# Patient Record
Sex: FEMALE | Race: BLACK OR AFRICAN AMERICAN | NOT HISPANIC OR LATINO | Employment: STUDENT | URBAN - METROPOLITAN AREA
[De-identification: names, ages, dates, MRNs, and addresses within clinical notes are randomized per-mention and may not be internally consistent; named-entity substitution may affect disease eponyms.]

---

## 2017-07-03 ENCOUNTER — HOSPITAL ENCOUNTER (EMERGENCY)
Facility: OTHER | Age: 32
Discharge: HOME | End: 2017-07-03
Attending: EMERGENCY MEDICINE
Payer: MEDICAID

## 2017-07-03 VITALS
DIASTOLIC BLOOD PRESSURE: 86 MMHG | SYSTOLIC BLOOD PRESSURE: 135 MMHG | TEMPERATURE: 99 F | HEIGHT: 65 IN | RESPIRATION RATE: 18 BRPM | OXYGEN SATURATION: 99 % | HEART RATE: 89 BPM | BODY MASS INDEX: 48.82 KG/M2 | WEIGHT: 293 LBS

## 2017-07-03 DIAGNOSIS — G93.2 PSEUDOTUMOR CEREBRI: Primary | ICD-10-CM

## 2017-07-03 DIAGNOSIS — H57.13 EYE PAIN, BILATERAL: ICD-10-CM

## 2017-07-03 LAB
B-HCG UR QL: NEGATIVE
CTP QC/QA: YES

## 2017-07-03 PROCEDURE — 25000003 PHARM REV CODE 250: Performed by: PHYSICIAN ASSISTANT

## 2017-07-03 PROCEDURE — 99283 EMERGENCY DEPT VISIT LOW MDM: CPT | Mod: 25

## 2017-07-03 PROCEDURE — 81025 URINE PREGNANCY TEST: CPT | Performed by: EMERGENCY MEDICINE

## 2017-07-03 RX ORDER — ACETAMINOPHEN AND CODEINE PHOSPHATE 300; 30 MG/1; MG/1
1 TABLET ORAL
Status: COMPLETED | OUTPATIENT
Start: 2017-07-03 | End: 2017-07-03

## 2017-07-03 RX ORDER — NAPROXEN 500 MG/1
500 TABLET ORAL 2 TIMES DAILY WITH MEALS
Qty: 14 TABLET | Refills: 0 | Status: SHIPPED | OUTPATIENT
Start: 2017-07-03 | End: 2017-07-10

## 2017-07-03 RX ORDER — IBUPROFEN 400 MG/1
800 TABLET ORAL
Status: COMPLETED | OUTPATIENT
Start: 2017-07-03 | End: 2017-07-03

## 2017-07-03 RX ORDER — ACETAMINOPHEN AND CODEINE PHOSPHATE 300; 30 MG/1; MG/1
1 TABLET ORAL EVERY 6 HOURS PRN
Qty: 8 TABLET | Refills: 0 | Status: SHIPPED | OUTPATIENT
Start: 2017-07-03 | End: 2017-07-13

## 2017-07-03 RX ORDER — PROPARACAINE HYDROCHLORIDE 5 MG/ML
1 SOLUTION/ DROPS OPHTHALMIC
Status: COMPLETED | OUTPATIENT
Start: 2017-07-03 | End: 2017-07-03

## 2017-07-03 RX ADMIN — ACETAMINOPHEN AND CODEINE PHOSPHATE 1 TABLET: 300; 30 TABLET ORAL at 11:07

## 2017-07-03 RX ADMIN — FLUORESCEIN SODIUM 1 STRIP: 1 STRIP OPHTHALMIC at 09:07

## 2017-07-03 RX ADMIN — IBUPROFEN 800 MG: 400 TABLET, FILM COATED ORAL at 11:07

## 2017-07-03 RX ADMIN — PROPARACAINE HYDROCHLORIDE 1 DROP: 5 SOLUTION/ DROPS OPHTHALMIC at 09:07

## 2017-07-04 NOTE — ED PROVIDER NOTES
Encounter Date: 7/3/2017       History     Chief Complaint   Patient presents with    Eye Pain     pt c/o 9/10 pain to bilateral eyes since november, pt denies going to eye doctor     32 y/o female with history of HTN, Asthma, obesity presents to the ER with chief complaint of bilateral eye pain intermittent for 5 years.  Patient was told that she has Pseudotumor cerebri in 2012.  The patient was referred to a neurologist by her ophthalmologist.     She was treated with naproxen, Tylenol #3, and percocet for her pain in the past.  The patient was scheduled to have a spinal tap in the past, but says this was never rescheduled.  The patient took tylenol PM for her pain 2 days ago. She reports joint pain also ongoing for 5 years.  Her pain has been worsening since November of 2016, but lost her PCP 1 year ago so she has not seen anyone recently.    She reports intermittent blurry vision.  She has intermittent left frontal headache, no current pain.  She currently has 9/10 behind both eyes.  She reports watery drainage from the eye, which makes her pain worse.  She denies nausea, vomiting, fever, or chills.            Review of patient's allergies indicates:   Allergen Reactions    Peanut     Shellfish containing products     Iodine and iodide containing products Itching     Past Medical History:   Diagnosis Date    Asthma     Hypertension     Obesity      Past Surgical History:   Procedure Laterality Date    APPENDECTOMY  7/2013    BOWEL RESECTION  7/2013    BREAST SURGERY      right breast I&D x 3    TONSILLECTOMY       Family History   Problem Relation Age of Onset    Breast cancer Other     Ovarian cancer Neg Hx      Social History   Substance Use Topics    Smoking status: Current Every Day Smoker     Packs/day: 0.50     Years: 5.00     Types: Cigarettes    Smokeless tobacco: Current User    Alcohol use No     Review of Systems   Constitutional: Negative for chills and fever.   HENT: Negative for  sore throat.    Eyes: Positive for pain. Negative for visual disturbance.   Respiratory: Negative for shortness of breath.    Cardiovascular: Negative for chest pain.   Gastrointestinal: Negative for abdominal pain, nausea and vomiting.   Genitourinary: Negative for dysuria.   Musculoskeletal: Negative for back pain.   Skin: Negative for rash.   Neurological: Positive for headaches. Negative for dizziness, syncope and weakness.   Hematological: Does not bruise/bleed easily.       Physical Exam     Initial Vitals [07/03/17 2024]   BP Pulse Resp Temp SpO2   138/81 106 18 98.6 °F (37 °C) 99 %      MAP       100         Physical Exam    Constitutional: She appears well-developed and well-nourished. No distress.   HENT:   Head: Atraumatic.   Mouth/Throat: Oropharynx is clear and moist.   Eyes: Conjunctivae and EOM are normal. Pupils are equal, round, and reactive to light. Right eye exhibits discharge (watery drainage). Left eye exhibits discharge ( watery drainage).   tonopen exam: right eye pressure = 20 , left eye pressure = 17.  Normal fundoscopic exam.   Neck: Normal range of motion. Neck supple.   Cardiovascular: Normal rate and regular rhythm.   Pulmonary/Chest: Breath sounds normal. No respiratory distress. She has no wheezes. She has no rhonchi. She has no rales.   Abdominal: Soft. Bowel sounds are normal. There is no tenderness.   Neurological: She is alert and oriented to person, place, and time. She has normal strength. No cranial nerve deficit.   Skin: Capillary refill takes less than 2 seconds. No rash noted.   Psychiatric: She has a normal mood and affect.         ED Course   Procedures  Labs Reviewed   POCT URINE PREGNANCY - Normal                   APC / Resident Notes:   Patient presents to the ER chief complaint of eye pain bilaterally.  Patient states that she experiences headaches behind her eyes daily ( intermittently in the left temple) for the last 5 years since being diagnosed with pseudotumor  cerebri.  The patient was referred to a neurologist, but did not have appropriate follow-up due to rescheduled spinal tap.  She recently lost her primary care physician and is not being followed in clinic at all.  Patient states that in the past when her pain became severe she was prescribed pain medications until her pain was tolerable.    She has not been evaluated for her joint pain by a rheumatologist in the past.  She has no joint redness, recent fever or obvious joint swelling.  I do not suspect infectious process.  There is no recent injury.    On exam, patient has normal intraocular pressure and no obvious abnormal funduscopic exam findings. Vision is normal. She has watery drainage from the eyes, otherwise no significant eye redness.    She has no focal weakness, numbness, meningismus, other focal neurologic deficit, history of trauma, fevers, elevated blood pressure to suggest meningitis, subarachnoid hemorrhage, TIA, stroke, mass, or hypertensive urgency.   The patient will require follow-up with internal medicine and neurology, but I do not see an indication for any emergent imaging or further testing in the ER for patient's chronic condition.  I will treat her pain with Tylenol #3 and naproxen.  I'll give first dose of these medications in the ER.  The patient is comfortable with this plan.  She is given return precautions.I discussed the care of this patient with my supervising MD.                  ED Course     Clinical Impression:   The primary encounter diagnosis was Pseudotumor cerebri. A diagnosis of Eye pain, bilateral was also pertinent to this visit.                           MACARIO Chandler  07/04/17 0135

## 2017-07-04 NOTE — ED NOTES
Pt last eye exam was 2 years ago when she got her present glasses that she wears everyday. She states that she has had eye pain and headache in her eyes since November.

## 2017-07-25 ENCOUNTER — HOSPITAL ENCOUNTER (EMERGENCY)
Facility: HOSPITAL | Age: 32
Discharge: HOME | End: 2017-07-25
Attending: EMERGENCY MEDICINE
Payer: MEDICAID

## 2017-07-25 VITALS
DIASTOLIC BLOOD PRESSURE: 71 MMHG | HEART RATE: 90 BPM | BODY MASS INDEX: 55.08 KG/M2 | TEMPERATURE: 98 F | OXYGEN SATURATION: 99 % | RESPIRATION RATE: 18 BRPM | WEIGHT: 293 LBS | SYSTOLIC BLOOD PRESSURE: 130 MMHG

## 2017-07-25 DIAGNOSIS — R52 BODY ACHES: Primary | ICD-10-CM

## 2017-07-25 PROCEDURE — 25000003 PHARM REV CODE 250: Performed by: NURSE PRACTITIONER

## 2017-07-25 PROCEDURE — 99283 EMERGENCY DEPT VISIT LOW MDM: CPT | Mod: 25

## 2017-07-25 PROCEDURE — 96372 THER/PROPH/DIAG INJ SC/IM: CPT

## 2017-07-25 PROCEDURE — 63600175 PHARM REV CODE 636 W HCPCS: Performed by: NURSE PRACTITIONER

## 2017-07-25 RX ORDER — DICLOFENAC SODIUM 25 MG/1
75 TABLET, DELAYED RELEASE ORAL ONCE
Status: COMPLETED | OUTPATIENT
Start: 2017-07-25 | End: 2017-07-25

## 2017-07-25 RX ORDER — DICLOFENAC SODIUM 75 MG/1
75 TABLET, DELAYED RELEASE ORAL 2 TIMES DAILY
Qty: 60 TABLET | Refills: 0 | Status: SHIPPED | OUTPATIENT
Start: 2017-07-25

## 2017-07-25 RX ORDER — KETOROLAC TROMETHAMINE 30 MG/ML
15 INJECTION, SOLUTION INTRAMUSCULAR; INTRAVENOUS
Status: COMPLETED | OUTPATIENT
Start: 2017-07-25 | End: 2017-07-25

## 2017-07-25 RX ADMIN — DICLOFENAC SODIUM 75 MG: 25 TABLET, DELAYED RELEASE ORAL at 06:07

## 2017-07-25 RX ADMIN — KETOROLAC TROMETHAMINE 15 MG: 30 INJECTION, SOLUTION INTRAMUSCULAR at 05:07
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
## 2017-07-25 NOTE — ED PROVIDER NOTES
Encounter Date: 7/25/2017       History     Chief Complaint   Patient presents with    Generalized Body Aches     since July 5th.     Patient is a 31 y.o. female who presents to the ED 07/25/2017 with a chief complaint of global joint pain worse in the morning and at night x 1 month.  The pain is worse in the morning and at night and is better with NSAIDS.  The pain is in her bi shoulders, elbows, PIP joints, knees, and ankles;  aching like; 8/10.  Denies fever, recent illness, or headache.   PMH includes asthma, htn, obesity, and pseudotumor cerbri.               Review of patient's allergies indicates:   Allergen Reactions    Peanut     Shellfish containing products     Iodine and iodide containing products Itching     Past Medical History:   Diagnosis Date    Asthma     Hypertension     Obesity      Past Surgical History:   Procedure Laterality Date    APPENDECTOMY  7/2013    BOWEL RESECTION  7/2013    BREAST SURGERY      right breast I&D x 3    TONSILLECTOMY       Family History   Problem Relation Age of Onset    Breast cancer Other     Ovarian cancer Neg Hx      Social History   Substance Use Topics    Smoking status: Current Every Day Smoker     Packs/day: 0.50     Years: 5.00     Types: Cigarettes    Smokeless tobacco: Current User    Alcohol use No     Review of Systems   Constitutional: Negative for chills and fever.   HENT: Negative for sore throat.    Respiratory: Negative for chest tightness, shortness of breath and wheezing.    Cardiovascular: Negative for chest pain and leg swelling.   Gastrointestinal: Negative for abdominal pain.   Genitourinary: Negative for dysuria.   Musculoskeletal: Positive for arthralgias (bi shoulder, elbow, PIP, knees and ankles throbbing pain with intermittent swelling 8/10). Negative for myalgias.   Skin: Negative for rash and wound.   Neurological: Negative for syncope, weakness and numbness.   Hematological: Does not bruise/bleed easily.       Physical  Exam     Initial Vitals [07/25/17 1556]   BP Pulse Resp Temp SpO2   135/75 107 18 98.5 °F (36.9 °C) --      MAP       95         Physical Exam    Nursing note and vitals reviewed.  Constitutional: Vital signs are normal. She appears well-developed and well-nourished.   HENT:   Head: Normocephalic and atraumatic.   Eyes: Pupils are equal, round, and reactive to light.   Neck: Normal range of motion. Neck supple.   Cardiovascular: Normal rate, regular rhythm, normal heart sounds and intact distal pulses.   No murmur heard.  Pulmonary/Chest: Breath sounds normal.   Abdominal: Soft. Normal appearance and bowel sounds are normal.   Musculoskeletal:        Hands:  Neurological: She is alert and oriented to person, place, and time. She has normal strength.   Skin: Skin is warm, dry and intact.   Psychiatric: She has a normal mood and affect. Her speech is normal and behavior is normal.         ED Course   Procedures  Labs Reviewed - No data to display          Medical Decision Making:   Differential Diagnosis:   RA  OA  Viral illness       APC / Resident Notes:   Patient is a 31 y.o. female who presents to the ED 07/25/2017 with a chief complaint of global joint pain x 1 month. The pain was better with anti inflammatories. Patient encouraged to follow up with PCP for further studies.  Highly suspicious for RA.  Given prescription for Voltaren PO.  Discussed GI prophylaxis and pain management. Patient verbalized understanding and is agreeable to this plan of care and follow up. Verbalized understanding of return precautions.                     ED Course     Clinical Impression:   The encounter diagnosis was Body aches.                           Maria Alejandra Tapia NP  07/25/17 5991

## 2018-06-29 ENCOUNTER — PATIENT MESSAGE (OUTPATIENT)
Dept: OPTOMETRY | Facility: CLINIC | Age: 33
End: 2018-06-29

## 2020-05-18 DIAGNOSIS — Z90.49 HISTORY OF BOWEL RESECTION: Primary | ICD-10-CM

## 2021-01-30 ENCOUNTER — HOSPITAL ENCOUNTER (EMERGENCY)
Facility: HOSPITAL | Age: 36
Discharge: HOME | End: 2021-01-30
Attending: EMERGENCY MEDICINE
Payer: MEDICAID

## 2021-01-30 VITALS
TEMPERATURE: 99 F | OXYGEN SATURATION: 100 % | HEART RATE: 89 BPM | BODY MASS INDEX: 48.82 KG/M2 | HEIGHT: 65 IN | SYSTOLIC BLOOD PRESSURE: 150 MMHG | RESPIRATION RATE: 20 BRPM | WEIGHT: 293 LBS | DIASTOLIC BLOOD PRESSURE: 83 MMHG

## 2021-01-30 DIAGNOSIS — L50.9 URTICARIA: Primary | ICD-10-CM

## 2021-01-30 PROCEDURE — 99284 EMERGENCY DEPT VISIT MOD MDM: CPT | Mod: ,,, | Performed by: EMERGENCY MEDICINE

## 2021-01-30 PROCEDURE — 99283 EMERGENCY DEPT VISIT LOW MDM: CPT

## 2021-01-30 PROCEDURE — 99284 PR EMERGENCY DEPT VISIT,LEVEL IV: ICD-10-PCS | Mod: ,,, | Performed by: EMERGENCY MEDICINE

## 2021-01-30 PROCEDURE — 25000003 PHARM REV CODE 250: Performed by: STUDENT IN AN ORGANIZED HEALTH CARE EDUCATION/TRAINING PROGRAM

## 2021-01-30 RX ORDER — EPINEPHRINE 0.3 MG/.3ML
1 INJECTION SUBCUTANEOUS
Qty: 1 DEVICE | Refills: 1 | Status: SHIPPED | OUTPATIENT
Start: 2021-01-30 | End: 2022-01-30

## 2021-01-30 RX ORDER — DIPHENHYDRAMINE HCL 50 MG
50 CAPSULE ORAL
Status: COMPLETED | OUTPATIENT
Start: 2021-01-30 | End: 2021-01-30

## 2021-01-30 RX ADMIN — DIPHENHYDRAMINE HYDROCHLORIDE 50 MG: 50 CAPSULE ORAL at 09:01

## 2021-02-03 ENCOUNTER — HOSPITAL ENCOUNTER (EMERGENCY)
Facility: HOSPITAL | Age: 36
Discharge: HOME | End: 2021-02-03
Attending: EMERGENCY MEDICINE
Payer: MEDICAID

## 2021-02-03 VITALS
HEIGHT: 65 IN | OXYGEN SATURATION: 99 % | DIASTOLIC BLOOD PRESSURE: 68 MMHG | WEIGHT: 293 LBS | TEMPERATURE: 98 F | BODY MASS INDEX: 48.82 KG/M2 | HEART RATE: 91 BPM | SYSTOLIC BLOOD PRESSURE: 125 MMHG | RESPIRATION RATE: 17 BRPM

## 2021-02-03 DIAGNOSIS — R07.9 CHEST PAIN: ICD-10-CM

## 2021-02-03 DIAGNOSIS — R19.7 DIARRHEA, UNSPECIFIED TYPE: ICD-10-CM

## 2021-02-03 DIAGNOSIS — R11.10 VOMITING, INTRACTABILITY OF VOMITING NOT SPECIFIED, PRESENCE OF NAUSEA NOT SPECIFIED, UNSPECIFIED VOMITING TYPE: ICD-10-CM

## 2021-02-03 DIAGNOSIS — M79.605 PAIN OF LEFT LOWER EXTREMITY: Primary | ICD-10-CM

## 2021-02-03 DIAGNOSIS — M54.32 SCIATICA OF LEFT SIDE: ICD-10-CM

## 2021-02-03 PROCEDURE — 93010 EKG 12-LEAD: ICD-10-PCS | Mod: ,,, | Performed by: INTERNAL MEDICINE

## 2021-02-03 PROCEDURE — 99284 EMERGENCY DEPT VISIT MOD MDM: CPT | Mod: ,,, | Performed by: EMERGENCY MEDICINE

## 2021-02-03 PROCEDURE — 99284 PR EMERGENCY DEPT VISIT,LEVEL IV: ICD-10-PCS | Mod: ,,, | Performed by: EMERGENCY MEDICINE

## 2021-02-03 PROCEDURE — 93010 ELECTROCARDIOGRAM REPORT: CPT | Mod: ,,, | Performed by: INTERNAL MEDICINE

## 2021-02-03 PROCEDURE — 99284 EMERGENCY DEPT VISIT MOD MDM: CPT | Mod: 25

## 2021-02-03 PROCEDURE — 93005 ELECTROCARDIOGRAM TRACING: CPT

## 2021-02-03 RX ORDER — ONDANSETRON 4 MG/1
8 TABLET, ORALLY DISINTEGRATING ORAL EVERY 8 HOURS PRN
Qty: 12 TABLET | Refills: 0 | Status: SHIPPED | OUTPATIENT
Start: 2021-02-03

## 2021-02-03 RX ORDER — MELOXICAM 7.5 MG/1
7.5 TABLET ORAL DAILY
Qty: 7 TABLET | Refills: 0 | Status: SHIPPED | OUTPATIENT
Start: 2021-02-03

## 2021-02-03 RX ORDER — METHOCARBAMOL 500 MG/1
1000 TABLET, FILM COATED ORAL 3 TIMES DAILY
Qty: 30 TABLET | Refills: 0 | Status: SHIPPED | OUTPATIENT
Start: 2021-02-03 | End: 2021-02-08

## 2021-04-26 ENCOUNTER — PATIENT MESSAGE (OUTPATIENT)
Dept: RESEARCH | Facility: HOSPITAL | Age: 36
End: 2021-04-26

## 2022-05-23 ENCOUNTER — HOSPITAL ENCOUNTER (EMERGENCY)
Facility: HOSPITAL | Age: 37
Discharge: HOME | End: 2022-05-23
Attending: EMERGENCY MEDICINE
Payer: MEDICAID

## 2022-05-23 VITALS
RESPIRATION RATE: 20 BRPM | DIASTOLIC BLOOD PRESSURE: 78 MMHG | TEMPERATURE: 100 F | SYSTOLIC BLOOD PRESSURE: 145 MMHG | OXYGEN SATURATION: 100 % | HEART RATE: 95 BPM | WEIGHT: 293 LBS | BODY MASS INDEX: 48.82 KG/M2 | HEIGHT: 65 IN

## 2022-05-23 DIAGNOSIS — I10 HYPERTENSION, UNSPECIFIED TYPE: ICD-10-CM

## 2022-05-23 DIAGNOSIS — N93.9 VAGINAL BLEEDING: Primary | ICD-10-CM

## 2022-05-23 DIAGNOSIS — M25.473 ANKLE SWELLING: ICD-10-CM

## 2022-05-23 PROBLEM — F32.A DEPRESSION AFFECTING PREGNANCY, ANTEPARTUM: Status: ACTIVE | Noted: 2022-02-11

## 2022-05-23 PROBLEM — B95.1 POSITIVE GBS TEST: Status: ACTIVE | Noted: 2020-11-23

## 2022-05-23 PROBLEM — O99.340 ANXIETY IN PREGNANCY, ANTEPARTUM: Status: ACTIVE | Noted: 2022-02-11

## 2022-05-23 PROBLEM — O99.340 DEPRESSION AFFECTING PREGNANCY, ANTEPARTUM: Status: ACTIVE | Noted: 2022-02-11

## 2022-05-23 PROBLEM — R03.0 ELEVATED BLOOD-PRESSURE READING WITHOUT DIAGNOSIS OF HYPERTENSION: Status: ACTIVE | Noted: 2022-02-11

## 2022-05-23 PROBLEM — G93.2 PSEUDOTUMOR CEREBRI: Status: ACTIVE | Noted: 2020-11-23

## 2022-05-23 PROBLEM — O09.292 PRIOR PREGNANCY COMPLICATED BY IUGR, ANTEPARTUM, SECOND TRIMESTER: Status: ACTIVE | Noted: 2022-02-11

## 2022-05-23 PROBLEM — F41.9 ANXIETY: Status: ACTIVE | Noted: 2017-08-08

## 2022-05-23 PROBLEM — F41.9 ANXIETY IN PREGNANCY, ANTEPARTUM: Status: ACTIVE | Noted: 2022-02-11

## 2022-05-23 LAB
ALBUMIN SERPL BCP-MCNC: 2.6 G/DL (ref 3.5–5.2)
ALP SERPL-CCNC: 51 U/L (ref 55–135)
ALT SERPL W/O P-5'-P-CCNC: 19 U/L (ref 10–44)
ANION GAP SERPL CALC-SCNC: 11 MMOL/L (ref 8–16)
AST SERPL-CCNC: 23 U/L (ref 10–40)
BASOPHILS # BLD AUTO: 0.04 K/UL (ref 0–0.2)
BASOPHILS NFR BLD: 0.5 % (ref 0–1.9)
BILIRUB SERPL-MCNC: 0.3 MG/DL (ref 0.1–1)
BILIRUB UR QL STRIP: NEGATIVE
BUN SERPL-MCNC: 7 MG/DL (ref 6–20)
CALCIUM SERPL-MCNC: 8.7 MG/DL (ref 8.7–10.5)
CHLORIDE SERPL-SCNC: 106 MMOL/L (ref 95–110)
CLARITY UR REFRACT.AUTO: ABNORMAL
CO2 SERPL-SCNC: 21 MMOL/L (ref 23–29)
COLOR UR AUTO: YELLOW
CREAT SERPL-MCNC: 0.7 MG/DL (ref 0.5–1.4)
DIFFERENTIAL METHOD: ABNORMAL
EOSINOPHIL # BLD AUTO: 0.2 K/UL (ref 0–0.5)
EOSINOPHIL NFR BLD: 2.5 % (ref 0–8)
ERYTHROCYTE [DISTWIDTH] IN BLOOD BY AUTOMATED COUNT: 17.2 % (ref 11.5–14.5)
EST. GFR  (AFRICAN AMERICAN): >60 ML/MIN/1.73 M^2
EST. GFR  (NON AFRICAN AMERICAN): >60 ML/MIN/1.73 M^2
GLUCOSE SERPL-MCNC: 82 MG/DL (ref 70–110)
GLUCOSE UR QL STRIP: NEGATIVE
HCT VFR BLD AUTO: 33.2 % (ref 37–48.5)
HGB BLD-MCNC: 10.7 G/DL (ref 12–16)
HGB UR QL STRIP: ABNORMAL
IMM GRANULOCYTES # BLD AUTO: 0.02 K/UL (ref 0–0.04)
IMM GRANULOCYTES NFR BLD AUTO: 0.2 % (ref 0–0.5)
KETONES UR QL STRIP: NEGATIVE
LEUKOCYTE ESTERASE UR QL STRIP: ABNORMAL
LYMPHOCYTES # BLD AUTO: 1.9 K/UL (ref 1–4.8)
LYMPHOCYTES NFR BLD: 23.1 % (ref 18–48)
MCH RBC QN AUTO: 25.8 PG (ref 27–31)
MCHC RBC AUTO-ENTMCNC: 32.2 G/DL (ref 32–36)
MCV RBC AUTO: 80 FL (ref 82–98)
MICROSCOPIC COMMENT: ABNORMAL
MONOCYTES # BLD AUTO: 0.6 K/UL (ref 0.3–1)
MONOCYTES NFR BLD: 6.8 % (ref 4–15)
NEUTROPHILS # BLD AUTO: 5.5 K/UL (ref 1.8–7.7)
NEUTROPHILS NFR BLD: 66.9 % (ref 38–73)
NITRITE UR QL STRIP: NEGATIVE
NRBC BLD-RTO: 0 /100 WBC
PH UR STRIP: 5 [PH] (ref 5–8)
PLATELET # BLD AUTO: 192 K/UL (ref 150–450)
PMV BLD AUTO: 12.5 FL (ref 9.2–12.9)
POTASSIUM SERPL-SCNC: 3.4 MMOL/L (ref 3.5–5.1)
PROT SERPL-MCNC: 7 G/DL (ref 6–8.4)
PROT UR QL STRIP: NEGATIVE
RBC # BLD AUTO: 4.14 M/UL (ref 4–5.4)
RBC #/AREA URNS AUTO: >100 /HPF (ref 0–4)
SODIUM SERPL-SCNC: 138 MMOL/L (ref 136–145)
SP GR UR STRIP: 1.01 (ref 1–1.03)
SQUAMOUS #/AREA URNS AUTO: 1 /HPF
URN SPEC COLLECT METH UR: ABNORMAL
WBC # BLD AUTO: 8.14 K/UL (ref 3.9–12.7)
WBC #/AREA URNS AUTO: 13 /HPF (ref 0–5)

## 2022-05-23 PROCEDURE — 99285 PR EMERGENCY DEPT VISIT,LEVEL V: ICD-10-PCS | Mod: ,,, | Performed by: PHYSICIAN ASSISTANT

## 2022-05-23 PROCEDURE — 99285 EMERGENCY DEPT VISIT HI MDM: CPT | Mod: 25

## 2022-05-23 PROCEDURE — 80053 COMPREHEN METABOLIC PANEL: CPT | Performed by: EMERGENCY MEDICINE

## 2022-05-23 PROCEDURE — 85025 COMPLETE CBC W/AUTO DIFF WBC: CPT | Performed by: EMERGENCY MEDICINE

## 2022-05-23 PROCEDURE — 25000003 PHARM REV CODE 250: Performed by: PHYSICIAN ASSISTANT

## 2022-05-23 PROCEDURE — 87086 URINE CULTURE/COLONY COUNT: CPT | Performed by: PHYSICIAN ASSISTANT

## 2022-05-23 PROCEDURE — 81001 URINALYSIS AUTO W/SCOPE: CPT | Performed by: PHYSICIAN ASSISTANT

## 2022-05-23 PROCEDURE — 99285 EMERGENCY DEPT VISIT HI MDM: CPT | Mod: ,,, | Performed by: PHYSICIAN ASSISTANT

## 2022-05-23 RX ORDER — LABETALOL 100 MG/1
200 TABLET, FILM COATED ORAL
Status: COMPLETED | OUTPATIENT
Start: 2022-05-23 | End: 2022-05-23

## 2022-05-23 RX ORDER — ASPIRIN 81 MG/1
81 TABLET ORAL DAILY
COMMUNITY

## 2022-05-23 RX ORDER — ALBUTEROL SULFATE 0.63 MG/3ML
0.63 SOLUTION RESPIRATORY (INHALATION) EVERY 6 HOURS PRN
COMMUNITY

## 2022-05-23 RX ADMIN — LABETALOL HYDROCHLORIDE 200 MG: 100 TABLET, FILM COATED ORAL at 05:05

## 2022-05-23 NOTE — ED NOTES
Assumed care of pt at this time. VSS, RR even and unlabored. Resting in bed comfortably. No voiced compaints of pain or discomfort at this time. Safety protocols remain, will continue to monitor.

## 2022-05-23 NOTE — ED NOTES
"Patient states bleeding that ran " down my leg" at 1330 today while standing in line, states she had a vaginal delivery 2 days ago, states abd pain, left foot swelling and SOB. Did not take Labetolol today, does not know dose.   "

## 2022-05-23 NOTE — ED NOTES
"Patient identifiers verified and correct for Ms Lombardo  C/C: Vaginal bleeding SEE NN  APPEARANCE: awake and alert in NAD.  SKIN: warm, dry and intact. No breakdown or bruising.  MUSCULOSKELETAL: Patient moving all extremities spontaneously, no obvious swelling or deformities noted. Ambulates independently.  RESPIRATORY: Positive  shortness of breath.Respirations unlabored.   CARDIAC: Denies CP, 2+ distal pulses; no peripheral edema  ABDOMEN: ABdomen round, pain to lower abdomen, reports bleeding " running down my leg"    : voids spontaneously, denies difficulty  Neurologic: AAO x 4; follows commands equal strength in all extremities; denies numbness/tingling. Denies dizziness  Denies weakness    "

## 2022-05-24 NOTE — DISCHARGE INSTRUCTIONS
Diagnosis:  Postpartum vaginal bleeding, hypertension    I am not sure what is causing your bleeding today but it seems to have slowed to normal postpartum lochia.  Your blood counts are stable.  Your blood pressure was elevated in the emergency department your not showing any signs or symptoms of preeclampsia.  Make sure to continue taking your labetalol as prescribed.    I discussed your case with our obstetric doctor at [facility] who will arrange for follow-up.  They will call to schedule a follow-up appointment.  If you start to have any worsening symptoms, very heavy bleeding lightheadedness, weakness, passing out, fever, severe abdominal pain severe headache, visual changes or swelling of your legs please return to the emergency department immediately.

## 2022-05-25 LAB — BACTERIA UR CULT: NORMAL

## 2022-05-31 ENCOUNTER — HOSPITAL ENCOUNTER (EMERGENCY)
Facility: OTHER | Age: 37
Discharge: HOME | End: 2022-05-31
Attending: OBSTETRICS & GYNECOLOGY
Payer: MEDICAID

## 2022-05-31 VITALS
OXYGEN SATURATION: 100 % | HEIGHT: 65 IN | SYSTOLIC BLOOD PRESSURE: 154 MMHG | HEART RATE: 87 BPM | WEIGHT: 293 LBS | BODY MASS INDEX: 48.82 KG/M2 | DIASTOLIC BLOOD PRESSURE: 77 MMHG | RESPIRATION RATE: 18 BRPM | TEMPERATURE: 98 F

## 2022-05-31 DIAGNOSIS — I10 HYPERTENSION, UNSPECIFIED TYPE: ICD-10-CM

## 2022-05-31 DIAGNOSIS — N63.10 BREAST MASS, RIGHT: ICD-10-CM

## 2022-05-31 DIAGNOSIS — H10.9 CONJUNCTIVITIS OF LEFT EYE, UNSPECIFIED CONJUNCTIVITIS TYPE: Primary | ICD-10-CM

## 2022-05-31 LAB
ALBUMIN SERPL BCP-MCNC: 3 G/DL (ref 3.5–5.2)
ALP SERPL-CCNC: 62 U/L (ref 55–135)
ALT SERPL W/O P-5'-P-CCNC: 13 U/L (ref 10–44)
ANION GAP SERPL CALC-SCNC: 13 MMOL/L (ref 8–16)
AST SERPL-CCNC: 21 U/L (ref 10–40)
BASOPHILS # BLD AUTO: 0.03 K/UL (ref 0–0.2)
BASOPHILS NFR BLD: 0.4 % (ref 0–1.9)
BILIRUB SERPL-MCNC: 0.2 MG/DL (ref 0.1–1)
BUN SERPL-MCNC: 10 MG/DL (ref 6–20)
CALCIUM SERPL-MCNC: 8.7 MG/DL (ref 8.7–10.5)
CHLORIDE SERPL-SCNC: 108 MMOL/L (ref 95–110)
CO2 SERPL-SCNC: 21 MMOL/L (ref 23–29)
CREAT SERPL-MCNC: 0.7 MG/DL (ref 0.5–1.4)
DIFFERENTIAL METHOD: ABNORMAL
EOSINOPHIL # BLD AUTO: 0.4 K/UL (ref 0–0.5)
EOSINOPHIL NFR BLD: 5.3 % (ref 0–8)
ERYTHROCYTE [DISTWIDTH] IN BLOOD BY AUTOMATED COUNT: 16.8 % (ref 11.5–14.5)
EST. GFR  (AFRICAN AMERICAN): >60 ML/MIN/1.73 M^2
EST. GFR  (NON AFRICAN AMERICAN): >60 ML/MIN/1.73 M^2
GLUCOSE SERPL-MCNC: 83 MG/DL (ref 70–110)
HCT VFR BLD AUTO: 35.4 % (ref 37–48.5)
HGB BLD-MCNC: 11.2 G/DL (ref 12–16)
IMM GRANULOCYTES # BLD AUTO: 0.01 K/UL (ref 0–0.04)
IMM GRANULOCYTES NFR BLD AUTO: 0.1 % (ref 0–0.5)
LYMPHOCYTES # BLD AUTO: 1.8 K/UL (ref 1–4.8)
LYMPHOCYTES NFR BLD: 24.3 % (ref 18–48)
MCH RBC QN AUTO: 26.5 PG (ref 27–31)
MCHC RBC AUTO-ENTMCNC: 31.6 G/DL (ref 32–36)
MCV RBC AUTO: 84 FL (ref 82–98)
MONOCYTES # BLD AUTO: 0.4 K/UL (ref 0.3–1)
MONOCYTES NFR BLD: 5.1 % (ref 4–15)
NEUTROPHILS # BLD AUTO: 4.8 K/UL (ref 1.8–7.7)
NEUTROPHILS NFR BLD: 64.8 % (ref 38–73)
NRBC BLD-RTO: 0 /100 WBC
PLATELET # BLD AUTO: 238 K/UL (ref 150–450)
PMV BLD AUTO: 12.3 FL (ref 9.2–12.9)
POTASSIUM SERPL-SCNC: 3.7 MMOL/L (ref 3.5–5.1)
PROT SERPL-MCNC: 7.7 G/DL (ref 6–8.4)
RBC # BLD AUTO: 4.23 M/UL (ref 4–5.4)
SODIUM SERPL-SCNC: 142 MMOL/L (ref 136–145)
WBC # BLD AUTO: 7.38 K/UL (ref 3.9–12.7)

## 2022-05-31 PROCEDURE — 99285 EMERGENCY DEPT VISIT HI MDM: CPT | Mod: 25

## 2022-05-31 PROCEDURE — 25000003 PHARM REV CODE 250

## 2022-05-31 PROCEDURE — 85025 COMPLETE CBC W/AUTO DIFF WBC: CPT

## 2022-05-31 PROCEDURE — 80053 COMPREHEN METABOLIC PANEL: CPT

## 2022-05-31 PROCEDURE — 99284 EMERGENCY DEPT VISIT MOD MDM: CPT | Mod: 24,,, | Performed by: OBSTETRICS & GYNECOLOGY

## 2022-05-31 PROCEDURE — 99284 PR EMERGENCY DEPT VISIT,LEVEL IV: ICD-10-PCS | Mod: 24,,, | Performed by: OBSTETRICS & GYNECOLOGY

## 2022-05-31 RX ORDER — ACETAMINOPHEN 500 MG
1000 TABLET ORAL ONCE
Status: COMPLETED | OUTPATIENT
Start: 2022-05-31 | End: 2022-05-31

## 2022-05-31 RX ORDER — NIFEDIPINE 10 MG/1
10 CAPSULE ORAL ONCE
Status: COMPLETED | OUTPATIENT
Start: 2022-05-31 | End: 2022-05-31

## 2022-05-31 RX ORDER — LABETALOL 200 MG/1
200 TABLET, FILM COATED ORAL ONCE
Status: COMPLETED | OUTPATIENT
Start: 2022-05-31 | End: 2022-05-31

## 2022-05-31 RX ORDER — ERYTHROMYCIN 5 MG/G
OINTMENT OPHTHALMIC
Qty: 1 EACH | Refills: 0 | Status: SHIPPED | OUTPATIENT
Start: 2022-05-31

## 2022-05-31 RX ORDER — LABETALOL 200 MG/1
300 TABLET, FILM COATED ORAL 2 TIMES DAILY
Qty: 90 TABLET | Refills: 11 | OUTPATIENT
Start: 2022-05-31 | End: 2023-06-01

## 2022-05-31 RX ORDER — DICLOXACILLIN SODIUM 250 MG/1
500 CAPSULE ORAL 4 TIMES DAILY
Qty: 14 CAPSULE | Refills: 0 | Status: SHIPPED | OUTPATIENT
Start: 2022-05-31

## 2022-05-31 RX ADMIN — NIFEDIPINE 10 MG: 10 CAPSULE ORAL at 10:05

## 2022-05-31 RX ADMIN — LABETALOL HYDROCHLORIDE 200 MG: 200 TABLET, FILM COATED ORAL at 10:05

## 2022-05-31 RX ADMIN — ACETAMINOPHEN 1000 MG: 500 TABLET, FILM COATED ORAL at 10:05

## 2022-05-31 NOTE — ED NOTES
[employer] pump, tubing, collections containers and labels brought to bedside.  Discussed proper pump setting of initiation phase.  Instructed on proper usage of pump and to adjust suction according to maximum comfort level.  Verified appropriate flange fit.

## 2022-05-31 NOTE — ED PROVIDER NOTES
Encounter Date: 2022       History     Chief Complaint   Patient presents with    Multiple Complaints      Pt reports she had a baby ten day ago at [facility]. Reports problems with bleeding after birth. Pt reports dizziness, blurry vision, and HTN that started this morning. Went to community center and ambulance was called to take her here. Reports abdominal pain. Reports 4 pads of blood in a day. Reports right thumb swelling and left foot swelling since the beginning of may, received at [facility] of her foot. Reports discharge from right breast. Reports pain and discharge from left eye.     Siddhartha Lombardo is a 36 y.o.  PPD #10 s/p  presenting for concerns of breast discharge and left eye pain. She reports this morning she was at a community center and started to get left eye pain and tearing. She also reports a headache starting this morning and elevated BP. She has had what she reports as pus expressed from her right breast since delivery as well. Denies F/C, CP, SOB, RUQ pain. Lochia is normal, changing 4 pads per day. This IUP was complicated by HTN (patient unsure if cHTN vs. PreE, unsure if she received magnesium, taking labetalol 200 BID).         Review of patient's allergies indicates:   Allergen Reactions    Hydrocodone-acetaminophen Swelling     Pt can tolerate Percocet, but cannot take Norco  NO ISSUES WITH TAKING PERCOCET    Morphine Shortness Of Breath    Peanut     Shellfish containing products     Sulfamethoxazole-trimethoprim Hives and Itching    Iodine and iodide containing products Itching     Past Medical History:   Diagnosis Date    Asthma     Hypertension     Obesity      Past Surgical History:   Procedure Laterality Date    APPENDECTOMY  2013    BOWEL RESECTION  2013    BREAST SURGERY      right breast I&D x 3    TONSILLECTOMY       Family History   Problem Relation Age of Onset    Breast cancer Other     Ovarian cancer Neg Hx      Social History     Tobacco Use     Smoking status: Current Every Day Smoker     Packs/day: 0.50     Years: 5.00     Pack years: 2.50     Types: Cigarettes    Smokeless tobacco: Current User   Substance Use Topics    Alcohol use: No     Alcohol/week: 0.0 standard drinks    Drug use: Yes     Types: Marijuana     Review of Systems   Constitutional: Negative for chills and fever.   HENT: Negative for congestion and sore throat.    Eyes: Positive for pain, discharge and visual disturbance.   Respiratory: Negative for cough and shortness of breath.    Cardiovascular: Positive for chest pain (breast pain/discharge).   Gastrointestinal: Negative for abdominal pain, constipation, diarrhea, nausea and vomiting.   Genitourinary: Positive for vaginal bleeding. Negative for dysuria and vaginal discharge.   Musculoskeletal: Negative for back pain.   Skin: Negative for rash.   Neurological: Positive for headaches. Negative for weakness.   Hematological: Does not bruise/bleed easily.   Psychiatric/Behavioral: Positive for dysphoric mood. Negative for suicidal ideas. The patient is nervous/anxious.        Physical Exam     Initial Vitals [05/31/22 1002]   BP Pulse Resp Temp SpO2   (!) 165/100 84 18 98.5 °F (36.9 °C) 100 %      MAP       --         Physical Exam    Vitals reviewed.  Constitutional: She appears well-developed and well-nourished. She is not diaphoretic. No distress.   HENT:   Head: Normocephalic and atraumatic.   Eyes: EOM are normal. Right eye exhibits no discharge. Left eye exhibits discharge (left eye redness and tearing).   Neck:   Normal range of motion.  Cardiovascular: Normal rate.   Pulmonary/Chest: No respiratory distress. Right breast exhibits mass and nipple discharge.   Abdominal: There is no abdominal tenderness. There is no rebound and no guarding.   Musculoskeletal:         General: No edema.      Cervical back: Normal range of motion.     Neurological: She is alert and oriented to person, place, and time.   Skin: Skin is warm and  dry.   Psychiatric: She has a normal mood and affect. Thought content normal.         ED Course   Procedures  Labs Reviewed   CBC W/ AUTO DIFFERENTIAL - Abnormal; Notable for the following components:       Result Value    Hemoglobin 11.2 (*)     Hematocrit 35.4 (*)     MCH 26.5 (*)     MCHC 31.6 (*)     RDW 16.8 (*)     All other components within normal limits   COMPREHENSIVE METABOLIC PANEL - Abnormal; Notable for the following components:    CO2 21 (*)     Albumin 3.0 (*)     All other components within normal limits          Imaging Results          US Chest Mediastinum (Final result)  Result time 05/31/22 15:01:55    Final result by Jim Smith MD (05/31/22 15:01:55)                 Impression:      Abnormal examination with complex cyst measuring 2.3 x 0.7 x 1.5 cm containing a fluid fluid level.  There are edematous changes in the adjacent breast tissue and there appears to be overlying skin thickening.  Findings are worrisome for breast abscess with galactocele considered less likely.      Electronically signed by: Jim Smith MD  Date:    05/31/2022  Time:    15:01             Narrative:    EXAMINATION:  US CHEST MEDIASTINUM    CLINICAL HISTORY:  Possible right breast abscess;    TECHNIQUE:  Focused ultrasound examination of the right breast was performed in this patient with history of right breast discharge, described as patient as pus.  Note that the patient is 10 days status post spontaneous vaginal delivery.    COMPARISON:  None    FINDINGS:  Within the retroareolar tissues, there is an irregularly-shaped collection measuring 2.3 x 0.7 x 1.5 cm in size containing low-level internal echoes.  There does appear to be a fluid fluid level within this complex cyst.  Findings could certainly represent a galactocele in this patient who is 10 days postpartum, however there does appear to be surrounding hypoechoic breast tissue and there does appear to be some associated skin thickening when compared to  comparison views of the retroareolar left breast.  Findings are worrisome for small breast abscess.                                 Medications   NIFEdipine capsule 10 mg (10 mg Oral Given 5/31/22 1042)   acetaminophen tablet 1,000 mg (1,000 mg Oral Given 5/31/22 1045)   labetaloL tablet 200 mg (200 mg Oral Given 5/31/22 1045)     Medical Decision Making:   ED Management:  - Afebrile   - 2 sustained severe range BP > Procardia 10 and home labetalol 200 > remaining BP normal to mild range   - Tylenol 1g with resolution of headache   - CBC 7/11/35/238  - Cr 0.7, AST/ALT 21/13  - Left eye red with tearing > will send Rx for erythromycin ointment to pharmacy   - Right breast: small firm mass under right areola > abscess vs clogged duct   - Breast pump used > no change in size of mall palpated in right breast   - Right breast/chest US: possible abscess   - Will send referral to gen surg due to concern for possible abscess and start dicloxacillin   - Increase labetalol to 300mg BID   - Patient stable for discharge at this time  - ED return precautions given  - She voiced understanding and is in agreement with the plan            Attending Attestation:   Physician Attestation Statement for Resident:  As the supervising MD   Physician Attestation Statement: I have personally seen and examined this patient.   I agree with the above history. -:   As the supervising MD I agree with the above PE.    As the supervising MD I agree with the above treatment, course, plan, and disposition.   -: Patient evaluated and found to be stable, agree with resident's assessment and plan.  Left eye with discharge c/w conjunctivitis.  Right breast with ~4cm mass under the nipple and areola that diminishes in size with pumping.  Mass is not tender and patient is able to tolerate pumping.  Breast sonogram c/w breast abscess or galactocele.  Afebrile and WBC normal.  Will set up with outpatient general surgery to evaluate.  Patient desires to  transfer care to Dr. Serrano.  Patient also requested psych consult, placed referral to Dr. Keller    I was personally present during the critical portions of the procedure(s) performed by the resident and was immediately available in the ED to provide services and assistance as needed during the entire procedure.  I have reviewed and agree with the residents interpretation of the following: lab data.  I have reviewed the following: old records at this facility.                ED Course as of 05/31/22 1858   Tue May 31, 2022   1425  Chest Mediastinum [CD]      ED Course User Index  [CD] Nohemi Brand MD                Clinical Impression:   Final diagnoses:  [H10.9] Conjunctivitis of left eye, unspecified conjunctivitis type (Primary)  [N63.10] Breast mass, right  [I10] Hypertension, unspecified type          ED Disposition Condition    Discharge Stable        ED Prescriptions     Medication Sig Dispense Start Date End Date Auth. Provider    erythromycin (ROMYCIN) ophthalmic ointment Place a 1/2 inch ribbon of ointment into the lower eyelid. 1 each 5/31/2022  Samira Toussaint MD    dicloxacillin (DYNAPEN) 250 MG capsule Take 2 capsules (500 mg total) by mouth 4 (four) times daily. 14 capsule 5/31/2022  Samira Toussaint MD    labetaloL (NORMODYNE) 200 MG tablet Take 1.5 tablets (300 mg total) by mouth 2 (two) times daily. 90 tablet 5/31/2022 5/31/2023 Nohemi Brand MD        Follow-up Information    None          Samira Toussaint MD  OBGYN, PGY-1       Samira Toussaint MD  Resident  05/31/22 1722       Nohemi Brand MD  05/31/22 4728

## 2022-06-02 ENCOUNTER — TELEPHONE (OUTPATIENT)
Dept: OBSTETRICS AND GYNECOLOGY | Facility: CLINIC | Age: 37
End: 2022-06-02
Payer: MEDICAID

## 2022-06-02 ENCOUNTER — TELEPHONE (OUTPATIENT)
Dept: PSYCHIATRY | Facility: CLINIC | Age: 37
End: 2022-06-02
Payer: MEDICAID

## 2022-06-02 NOTE — TELEPHONE ENCOUNTER
Pt was scheduled for a visit with our office at the [facility].

## 2022-06-02 NOTE — TELEPHONE ENCOUNTER
----- Message from Iliana Freeman MA sent at 5/31/2022  4:41 PM CDT -----    ----- Message -----  From: Jerica Hardwick MD  Sent: 5/31/2022   4:07 PM CDT  To: Zach GARCIA Staff    Hello,     This patient is post partum and is looking for a new OBGYN. She received care at [facility]. She requested to see Dr. Serrano. Can she establish care?     Thanks!     Cheryl

## 2022-08-05 NOTE — ED PROVIDER NOTES
"Encounter Date: 2022       History     Chief Complaint   Patient presents with    Vaginal Bleeding     Last period in sept, irreg periods, 4 pads     36-year-old female  with hypertension, asthma, obesity presents for vaginal bleeding s/p  at 38w2d 2 days ago at [facility] (Dr. Lanette Lombardo).  She was discharged yesterday from the hospital and states that she was at the bank when she started to have heavy vaginal bleeding, soaking through 3 pads with blood running down her leg.  EMS was called by the teller at the bank.  Prior to this, she had normal lochia similar to a menstrual period.  She reports associated fatigue, swelling of her left ankle and right index finger as well as some right lower quadrant abdominal discomfort.  Her pregnancy was complicated by hypertension, she did not take her labetalol today.  Her infant is admitted at the [facility] NICU for hypoglycemia.  She denies any complications in her prior pregnancies and no history of preeclampsia.  No headache, no visual changes, no swelling of her calves.  She was unhappy with the care that she received at [facility] and had "security concerns" about the staff at that facility and wants a new Ob/gyn.        Review of patient's allergies indicates:   Allergen Reactions    Hydrocodone-acetaminophen Swelling     Pt can tolerate Percocet, but cannot take Norco  NO ISSUES WITH TAKING PERCOCET    Morphine Shortness Of Breath    Peanut     Shellfish containing products     Sulfamethoxazole-trimethoprim Hives and Itching    Iodine and iodide containing products Itching     Past Medical History:   Diagnosis Date    Asthma     Hypertension     Obesity      Past Surgical History:   Procedure Laterality Date    APPENDECTOMY  2013    BOWEL RESECTION  2013    BREAST SURGERY      right breast I&D x 3    TONSILLECTOMY       Family History   Problem Relation Age of Onset    Breast cancer Other     Ovarian cancer Neg Hx      Social History "     Tobacco Use    Smoking status: Current Every Day Smoker     Packs/day: 0.50     Years: 5.00     Pack years: 2.50     Types: Cigarettes    Smokeless tobacco: Current User   Substance Use Topics    Alcohol use: No     Alcohol/week: 0.0 standard drinks    Drug use: Yes     Types: Marijuana     Review of Systems   Constitutional: Positive for fatigue. Negative for fever.   HENT: Negative for sore throat.    Eyes: Negative for visual disturbance.   Respiratory: Negative for shortness of breath.    Cardiovascular: Negative for chest pain, palpitations and leg swelling.   Gastrointestinal: Negative for abdominal pain, nausea and vomiting.   Genitourinary: Positive for pelvic pain and vaginal bleeding. Negative for dysuria.   Musculoskeletal: Positive for joint swelling. Negative for back pain.   Skin: Negative for rash.   Neurological: Negative for weakness and headaches.   Hematological: Does not bruise/bleed easily.       Physical Exam     Initial Vitals [05/23/22 1553]   BP Pulse Resp Temp SpO2   (!) 167/103 (!) 120 20 99.6 °F (37.6 °C) 100 %      MAP       --         Physical Exam    Nursing note and vitals reviewed.  Constitutional: She appears well-developed and well-nourished. She is not diaphoretic. No distress.   HENT:   Head: Normocephalic and atraumatic.   Eyes: EOM are normal. Pupils are equal, round, and reactive to light.   Neck:   Normal range of motion.  Cardiovascular: Normal rate, regular rhythm, normal heart sounds and intact distal pulses. Exam reveals no gallop and no friction rub.    No murmur heard.  There is minimal swelling around the left ankle.  No swelling of the calves bilaterally.   Pulmonary/Chest: Breath sounds normal. No respiratory distress. She has no wheezes. She has no rhonchi. She has no rales. She exhibits no tenderness.   Abdominal: Abdomen is soft. Bowel sounds are normal. She exhibits no distension and no mass. There is no abdominal tenderness.   Vague lower abdominal  [[id number]] tenderness.  No guarding. There is no rebound and no guarding.   Genitourinary:    Genitourinary Comments: ED tech present as chaperone for vaginal exam.  There is scant blood pooling in the vaginal vault.  Cervical os is closed.  No CMT, uterine or adnexal tenderness.     Musculoskeletal:         General: Normal range of motion.      Right elbow: Normal.      Left elbow: Normal.      Right forearm: Normal.      Left forearm: Normal.      Right wrist: Normal.      Left wrist: Normal.      Right hand: Normal.      Left hand: Normal.      Cervical back: Normal range of motion.     Neurological: She is alert and oriented to person, place, and time.   Skin: Skin is warm and dry.   Psychiatric: She has a normal mood and affect.         ED Course   Procedures  Labs Reviewed   CBC W/ AUTO DIFFERENTIAL - Abnormal; Notable for the following components:       Result Value    Hemoglobin 10.7 (*)     Hematocrit 33.2 (*)     MCV 80 (*)     MCH 25.8 (*)     RDW 17.2 (*)     All other components within normal limits   COMPREHENSIVE METABOLIC PANEL - Abnormal; Notable for the following components:    Potassium 3.4 (*)     CO2 21 (*)     Albumin 2.6 (*)     Alkaline Phosphatase 51 (*)     All other components within normal limits   URINALYSIS, REFLEX TO URINE CULTURE - Abnormal; Notable for the following components:    Appearance, UA Hazy (*)     Occult Blood UA 3+ (*)     Leukocytes, UA 1+ (*)     All other components within normal limits    Narrative:     Specimen Source->Urine   URINALYSIS MICROSCOPIC - Abnormal; Notable for the following components:    RBC, UA >100 (*)     WBC, UA 13 (*)     All other components within normal limits    Narrative:     Specimen Source->Urine   CULTURE, URINE          Imaging Results          US Lower Extremity Veins Left (Final result)  Result time 05/23/22 19:44:07    Final result by Bandar Gunn MD (05/23/22 19:44:07)                 Impression:      No evidence of deep venous thrombosis  in the left lower extremity.      Electronically signed by: Bandar Gunn MD  Date:    05/23/2022  Time:    19:44             Narrative:    EXAMINATION:  US LOWER EXTREMITY VEINS LEFT    CLINICAL HISTORY:  Effusion, unspecified ankle    TECHNIQUE:  Duplex and color flow Doppler evaluation and graded compression of the left lower extremity veins was performed.    COMPARISON:  None    FINDINGS:  Left thigh veins: The common femoral, femoral, popliteal, upper greater saphenous, and deep femoral veins are patent and free of thrombus. The veins are normally compressible and have normal phasic flow and augmentation response.    Left calf veins: The visualized calf veins are patent.    Contralateral CFV: The contralateral (right) common femoral vein is patent and free of thrombus.    Miscellaneous: None                               US Pelvis Complete Non OB (Final result)  Result time 05/23/22 20:02:27   Procedure changed from US Pelvis Comp with Transvag NON-OB (xpd)     Final result by Elio Duncan MD (05/23/22 20:02:27)                 Impression:      Limited exam, only transabdominal sonography performed in setting of recent postpartum state.    Endometrial stripe by transabdominal view measures 15 mm which may be overestimated due to technique.  Within the endometrial canal there is a 1.1 cm avascular hypoechoic focus.  The findings may relate to normal recent postpartum state and blood products.  Retained products of conception or endometritis felt less likely, although not entirely excluded.  Recommend close follow-up with OB.    Electronically signed by resident: Dionicio Whitmore  Date:    05/23/2022  Time:    19:23    Electronically signed by: Elio Duncan MD  Date:    05/23/2022  Time:    20:02             Narrative:    EXAMINATION:  US PELVIS COMPLETE NON OB    CLINICAL HISTORY:  Vaginal bleeding;    Additional history:    Two days postpartum with no suspicion of abnormal vaginal delivery or peripartum  events.    TECHNIQUE:  Transabdominal sonography of the pelvis was performed.  Transvaginal sonography deferred in setting of recent postpartum status and without immediately available OBGYN for approval.  Emergency staff providers aware and agree.    COMPARISON:  None.    FINDINGS:  Uterus:    Size: 15.9 x 9.3 x 11.0 cm    Masses: None    Endometrium: Endometrial stripe by transabdominal view measures 15 mm in this recent postpartum state patient.  Slightly heterogeneous hypoechoic avascular focus in the endometrial canal measures 1.1 x 1.0 x 1.1 cm    Right ovary:    Size: 2.7 x 1.5 x 1.7 cm trans abdominally    Appearance: Normal    Vascular flow: Normal.    Left ovary:    Size: 1.8 x 1.6 x 1.9 cm trans abdominally    Appearance: Normal    Vascular Flow: Normal.    Free Fluid:    None.                                 Medications   labetaloL tablet 200 mg (200 mg Oral Given 22)     Medical Decision Making:   History:   Old Medical Records: I decided to obtain old medical records.  Old Records Summarized: records from another hospital.       <> Summary of Records: Patient discharged yesterday from [facility].  Brief discharge summary below:    Brief Hospital Course: 36 y.o. now  who presented at 38w2d and was admitted for IOL. Her obstetric history was notable for IUGR, CHTN, limited PNC, short interval pregnancy and anxiety/depression. She delivered a viable female infant via . Her intrapartum course was uncomplicated . Her postpartum course was uncomplicated.    She was determined to be meeting all goals and discharged on PPD 2. Signs and symptoms of postpartum depression and preeclampsia discussed with patient.     Recent Labs   Lab 22  0858 22  0822   WBC 7.3 7.6   HGB 10.6* 11.5   HEMATOCRIT 34.0* 37.0    159   MCV 83.3 84.5        Initial Assessment:   36-year-old female presenting for vaginal bleeding after recent delivery.  Initially hypertensive 167/103 and tachycardic  with heart of 120. Nontoxic appearing.  Differential Diagnosis:   Blood loss anemia  Retained placenta  She has no symptoms of preeclampsia but her BP is concerning  Her ankle has minimal swelling on exam, I think DVT is unlikely    Clinical Tests:   Lab Tests: Ordered and Reviewed  Radiological Study: Ordered and Reviewed  ED Management:  Will check labs, do ultrasounds and reassess.    Labs are notable for stable hemoglobin.  No protein in the urine.  Renal function is normal.  No DVT on ultrasound.  Pelvic ultrasound shows some fluid in endocervical canal which is not unexpected given her recent delivery.  This patient with Ob/gyn given her persistent elevated blood pressures.  Given her history of chronic hypertension and no concerning signs or symptoms for preeclampsia, they recommend discharge.  Referral placed for follow-up in Ob/gyn clinic.  I instructed the patient to follow up in clinic and return to the ED for any worsening symptoms. Stressed the importance of follow-up, strict ED return precautions given.  Patient voiced understanding and is comfortable with discharge. I discussed this patient with my supervising physician.    Other:   I have discussed this case with another health care provider.       <> Summary of the Discussion: See ED course            Attending Attestation:     Physician Attestation Statement for NP/PA:   I discussed this assessment and plan of this patient with the NP/PA, but I did not personally examine the patient. The face to face encounter was performed by the NP/PA.              ED Course as of 05/24/22 1841   Mon May 23, 2022   1648 BP(!): 167/103 [CC]   1648 BP(!): 157/68 [CC]   1721 Hemoglobin(!): 10.7  Compared to 11.5 5/21/2022 from [facility].  This was prior to her delivery. [CC]   1745 Creatinine: 0.7 [CC]   1918 I discussed this patient with Radiology, they were unable to perform a transvaginal ultrasound as patient is postpartum.  They report blood in the  endocervical canal which is normal given her postpartum status. [CC]   1923 Protein, UA: Negative [CC]   1923 RBC, UA(!): >100  Expected with vaginal bleeding, there are a few WBCs in the urine but I think this is not truly represent UTI.  Will hold antibiotics at this time pending culture. [CC]   2003 US Lower Extremity Veins Left  No DVT [CC]   2018 US Pelvis Complete Non OB  Ultrasound shows likely blood products endometrial canal.  Will discuss with Ob/gyn [CC]   2027 I discussed this patient with OB staff Dr. Mckenna, given stable hemoglobin, chronic hypertension, no protein in her urine and no concerning signs or symptoms, she recommends discharge.  She will help arrange for ER follow-up in clinic. [CC]      ED Course User Index  [CC] Taina Mullins PA-C             Clinical Impression:   Final diagnoses:  [M25.473] Ankle swelling  [N93.9] Vaginal bleeding (Primary)  [I10] Hypertension, unspecified type          ED Disposition Condition    Discharge Stable        ED Prescriptions     None        Follow-up Information     Follow up With Specialties Details Why Contact Info    [facility] Obstetrics and Gynecology Schedule an appointment as soon as possible for a visit in 1 week  [address] [address]  [phone]    [facility] - Emergency Dept Emergency Medicine Go to  If symptoms worsen [address] [id number] [phone]           Taina Mullins PA-C  05/23/22 2118       Dallas Sanderson Jr., MD  05/24/22 1482

## 2023-05-01 PROBLEM — O09.292 PRIOR PREGNANCY COMPLICATED BY IUGR, ANTEPARTUM, SECOND TRIMESTER: Status: RESOLVED | Noted: 2022-02-11 | Resolved: 2023-05-01

## 2023-06-01 ENCOUNTER — HOSPITAL ENCOUNTER (EMERGENCY)
Facility: HOSPITAL | Age: 38
Discharge: PSYCHIATRIC OR SUBSTANCE USE FACILITY | End: 2023-06-01
Attending: EMERGENCY MEDICINE
Payer: MEDICAID

## 2023-06-01 VITALS
HEIGHT: 65 IN | OXYGEN SATURATION: 98 % | HEART RATE: 83 BPM | BODY MASS INDEX: 48.82 KG/M2 | TEMPERATURE: 99 F | SYSTOLIC BLOOD PRESSURE: 151 MMHG | WEIGHT: 293 LBS | RESPIRATION RATE: 18 BRPM | DIASTOLIC BLOOD PRESSURE: 94 MMHG

## 2023-06-01 DIAGNOSIS — O16.2 HYPERTENSION DURING PREGNANCY IN SECOND TRIMESTER, UNSPECIFIED HYPERTENSION IN PREGNANCY TYPE: Primary | ICD-10-CM

## 2023-06-01 PROCEDURE — 99285 EMERGENCY DEPT VISIT HI MDM: CPT

## 2023-06-01 PROCEDURE — 25000003 PHARM REV CODE 250: Performed by: EMERGENCY MEDICINE

## 2023-06-01 RX ORDER — LORAZEPAM 0.5 MG/1
1 TABLET ORAL ONCE AS NEEDED
Status: COMPLETED | OUTPATIENT
Start: 2023-06-01 | End: 2023-06-01

## 2023-06-01 RX ORDER — LABETALOL 100 MG/1
100 TABLET, FILM COATED ORAL
Status: COMPLETED | OUTPATIENT
Start: 2023-06-01 | End: 2023-06-01

## 2023-06-01 RX ORDER — LABETALOL 100 MG/1
100 TABLET, FILM COATED ORAL 2 TIMES DAILY
Qty: 60 TABLET | Refills: 0 | Status: SHIPPED | OUTPATIENT
Start: 2023-06-01 | End: 2023-07-01

## 2023-06-01 RX ORDER — DIPHENHYDRAMINE HCL 25 MG
25 CAPSULE ORAL ONCE AS NEEDED
Status: COMPLETED | OUTPATIENT
Start: 2023-06-01 | End: 2023-06-01

## 2023-06-01 RX ADMIN — LABETALOL HYDROCHLORIDE 100 MG: 100 TABLET, FILM COATED ORAL at 01:06

## 2023-06-01 RX ADMIN — LORAZEPAM 1 MG: 0.5 TABLET ORAL at 01:06

## 2023-06-01 RX ADMIN — DIPHENHYDRAMINE HYDROCHLORIDE 25 MG: 25 CAPSULE ORAL at 01:06

## 2023-06-01 NOTE — ED NOTES
Patient escorted back to psych facility by [facility],tech,and hospital security.

## 2023-06-01 NOTE — ED PROVIDER NOTES
"Encounter Date: 6/1/2023       History     Chief Complaint   Patient presents with    Hypertension     BIB [facility]. PEC pt from [facility] for eval of HTN. MAR sent by facility states she has rx clonidine PRN, but it was not given. Pt is also pregnant, appx 8-10 weeks. Flat affect upon arrival.     37-year-old female with history of bipolar disorder being sent from psych facility due to hypertension.  Patient was recently PEC'd for hallucinations.  She was found to be pregnant.  Unable/unwilling to provide only minimal details of presentation.  She is a flat affect consistent with psych disorder.  She is currently under pec.  Majority of history obtained via chart review.  Patient denies any complaints including abdominal pain, vaginal bleeding or discharge.  Beyond this, patient answers remainder of questions with "I do not know".    Review of patient's allergies indicates:   Allergen Reactions    Hydrocodone-acetaminophen Swelling     Pt can tolerate Percocet, but cannot take Norco  NO ISSUES WITH TAKING PERCOCET    Morphine Shortness Of Breath    Peanut     Shellfish containing products     Sulfamethoxazole-trimethoprim Hives and Itching    Iodine and iodide containing products Itching     Past Medical History:   Diagnosis Date    Asthma     Hypertension     Obesity      Past Surgical History:   Procedure Laterality Date    APPENDECTOMY  7/2013    BOWEL RESECTION  7/2013    BREAST SURGERY      right breast I&D x 3    TONSILLECTOMY       Family History   Problem Relation Age of Onset    Breast cancer Other     Ovarian cancer Neg Hx      Social History     Tobacco Use    Smoking status: Every Day     Packs/day: 0.50     Years: 5.00     Pack years: 2.50     Types: Cigarettes    Smokeless tobacco: Current   Substance Use Topics    Alcohol use: No     Alcohol/week: 0.0 standard drinks    Drug use: Yes     Types: Marijuana     Review of Systems   Unable to perform ROS: Psychiatric disorder     Physical Exam "     Initial Vitals [06/01/23 1258]   BP Pulse Resp Temp SpO2   (!) 148/97 88 18 97.5 °F (36.4 °C) 98 %      MAP       --         Physical Exam    Nursing note and vitals reviewed.  Constitutional: She appears well-developed and well-nourished. She is not diaphoretic. No distress.   HENT:   Head: Normocephalic and atraumatic.   Nose: Nose normal.   Eyes: EOM are normal. Pupils are equal, round, and reactive to light. No scleral icterus.   Neck: Neck supple.   Normal range of motion.  Cardiovascular:  Normal rate, regular rhythm, normal heart sounds and intact distal pulses.     Exam reveals no gallop and no friction rub.       No murmur heard.  Pulmonary/Chest: Breath sounds normal. No stridor. No respiratory distress. She has no wheezes. She has no rhonchi. She has no rales.   Abdominal: Abdomen is soft. Bowel sounds are normal. She exhibits no distension. There is no abdominal tenderness. There is no rebound and no guarding.   Musculoskeletal:         General: No tenderness or edema. Normal range of motion.      Cervical back: Normal range of motion and neck supple.     Neurological: She is alert and oriented to person, place, and time. No cranial nerve deficit. GCS score is 15. GCS eye subscore is 4. GCS verbal subscore is 5. GCS motor subscore is 6.   Skin: Skin is warm and dry. No rash noted.   Psychiatric:   Flat affect, will not engage in interview       ED Course   Procedures  Labs Reviewed - No data to display       Imaging Results              US OB Limited 1 Or More Gestations (Final result)  Result time 06/01/23 14:05:52   Procedure changed from US OB <14 Wks TransAbd & TransVag, Single Gestation (XPD)     Final result by Navi Powers III, MD (06/01/23 14:05:52)                   Impression:      Single live intrauterine pregnancy.      Electronically signed by: Navi Powers MD  Date:    06/01/2023  Time:    14:05               Narrative:    EXAMINATION:  US OB LIMITED 1 OR MORE  GESTATIONS    CLINICAL HISTORY:  Psych, pregnant, hypertension. Unknown gestational age.;    FINDINGS:  There is a single pregnancy.  Placenta is anterior.  EDA is 4.6 cc.  Heart rate is 161 beats per minute.  Gestational age is 16 weeks 5 days.  Delivery date is 2023.                                       Medications   labetaloL tablet 100 mg (100 mg Oral Given 23 1321)   LORazepam tablet 1 mg (1 mg Oral Given 23 1336)   diphenhydrAMINE capsule 25 mg (25 mg Oral Given 23 1336)     Medical Decision Making:   Initial Assessment:   37-year-old female presenting under pec for hypertension.  Patient has been confirm pregnant.  Per outside hospital review, patient has a positive HCG of 35,000. It is estimated she might be 8 weeks?  Patient can not provide answers for this.  She was reportedly on antihypertensive medication for prior pregnancy.  Per EMS, her blood pressure at outside facility was 180 systolic.  It was 150 with EMS.  It is approximate the same here, 148/97.  Patient denies abdominal pain.  She is otherwise in no acute distress and well-appearing albeit with flat affect.  Per review of records, the patient had previously been a .  Currently she is G4, it is unknown what result of her last pregnancy is other than resulted in a delivery.  Patient is refusing to provide a urine sample.  Though can confirm pregnancy status from yesterday.  Will get ultrasound to try to determine dates.  Will give labetalol as she has been on labetalol 200 mg b.i.d. for her prior pregnancy.  Will start at 100 b.i.d. given blood pressure 148/97.  If date approximation is correct and she is 8 weeks, preeclampsia is unlikely.  ED Management:  Ultrasound confirmed gestational age of 16 weeks 5 days.  Discussed case with Dr. Cheng.  Given early stage, preeclampsia very unlikely.  Patient is started on labetalol 100 b.i.d..  Referral placed for outpatient follow-up.  Believe she is safe for discharge to psych  facility.                        Clinical Impression:   Final diagnoses:  [O16.2] Hypertension during pregnancy in second trimester, unspecified hypertension in pregnancy type (Primary)        ED Disposition Condition    Discharge Stable          ED Prescriptions       Medication Sig Dispense Start Date End Date Auth. Provider    labetaloL (NORMODYNE) 100 MG tablet Take 1 tablet (100 mg total) by mouth 2 (two) times daily. 60 tablet 6/1/2023 7/1/2023 Damaso Chaparro MD          Follow-up Information       Follow up With Specialties Details Why Contact [facility] Emergency Dept Emergency Medicine  As needed, If symptoms worsen [address]  [facility] [id number] [phone]             Damaso Chaparro MD  06/01/23 1920

## 2023-06-01 NOTE — DISCHARGE INSTRUCTIONS
You were seen in the emergency department for high blood pressure.  Your exam and labs are reassuring.  You are 16 weeks and 5 days pregnant.  We have given you a prescription for blood pressure medication.  We think you're safe to go home.  It is very important you follow-up with an OBGYN very soon for prenatal care..  Please return forany new or worsening pain, nausea, vomiting, difficulty breathing, changes in vision, severe headache, numbness, weakness, or you become concerned in any other way.

## 2023-06-01 NOTE — ED NOTES
[facility]. Facility called the ambulance after pt was found to be hypertensive because pt is also pregnant and they were concerned. Pt denies pregnancy and is being uncooperative at present. Pt BP is currently 148/97. Pt will not answer most of the questions she is asked. Pt HR and RR are WDL. She is in no apparent distress at this time.

## 2023-06-15 ENCOUNTER — TELEPHONE (OUTPATIENT)
Dept: OBSTETRICS AND GYNECOLOGY | Facility: CLINIC | Age: 38
End: 2023-06-15
Payer: MEDICAID

## 2023-06-15 DIAGNOSIS — Z34.90 PREGNANCY: Primary | ICD-10-CM

## 2024-03-15 ENCOUNTER — HOSPITAL ENCOUNTER (EMERGENCY)
Facility: HOSPITAL | Age: 39
Discharge: HOME | End: 2024-03-15
Attending: STUDENT IN AN ORGANIZED HEALTH CARE EDUCATION/TRAINING PROGRAM
Payer: MEDICAID

## 2024-03-15 VITALS
DIASTOLIC BLOOD PRESSURE: 97 MMHG | OXYGEN SATURATION: 100 % | WEIGHT: 293 LBS | SYSTOLIC BLOOD PRESSURE: 147 MMHG | TEMPERATURE: 98 F | BODY MASS INDEX: 48.82 KG/M2 | HEIGHT: 65 IN | RESPIRATION RATE: 16 BRPM | HEART RATE: 67 BPM
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
## 2024-03-15 DIAGNOSIS — M25.552 LEFT HIP PAIN: ICD-10-CM

## 2024-03-15 DIAGNOSIS — Z87.81 HISTORY OF HIP FRACTURE: ICD-10-CM

## 2024-03-15 PROCEDURE — 25000003 PHARM REV CODE 250

## 2024-03-15 PROCEDURE — 63600175 PHARM REV CODE 636 W HCPCS

## 2024-03-15 PROCEDURE — 96372 THER/PROPH/DIAG INJ SC/IM: CPT

## 2024-03-15 PROCEDURE — 99285 EMERGENCY DEPT VISIT HI MDM: CPT | Mod: 25

## 2024-03-15 RX ORDER — OXYCODONE AND ACETAMINOPHEN 5; 325 MG/1; MG/1
1 TABLET ORAL EVERY 6 HOURS PRN
Qty: 12 TABLET | Refills: 0 | Status: SHIPPED | OUTPATIENT
Start: 2024-03-15 | End: 2024-04-21 | Stop reason: CLARIF

## 2024-03-15 RX ORDER — OXYCODONE HYDROCHLORIDE 5 MG/1
5 TABLET ORAL
Status: COMPLETED | OUTPATIENT
Start: 2024-03-15 | End: 2024-03-15

## 2024-03-15 RX ORDER — ACETAMINOPHEN 325 MG/1
650 TABLET ORAL
Status: DISCONTINUED | OUTPATIENT
Start: 2024-03-15 | End: 2024-03-15

## 2024-03-15 RX ORDER — KETOROLAC TROMETHAMINE 30 MG/ML
15 INJECTION, SOLUTION INTRAMUSCULAR; INTRAVENOUS
Status: COMPLETED | OUTPATIENT
Start: 2024-03-15 | End: 2024-03-15

## 2024-03-15 RX ADMIN — OXYCODONE 5 MG: 5 TABLET ORAL at 04:03

## 2024-03-15 RX ADMIN — KETOROLAC TROMETHAMINE 15 MG: 30 INJECTION, SOLUTION INTRAMUSCULAR; INTRAVENOUS at 02:03
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
## 2024-03-15 NOTE — FIRST PROVIDER EVALUATION
Emergency Department TeleTriage Encounter Note      CHIEF COMPLAINT    Chief Complaint   Patient presents with    Hip Pain     Reports L side hip pain after falling. Reports difficulty ambulating and pain. Had hip dislocated on that side before.         VITAL SIGNS   Initial Vitals [03/15/24 1223]   BP Pulse Resp Temp SpO2   136/74 98 16 98 °F (36.7 °C) 100 %      MAP       --            ALLERGIES    Review of patient's allergies indicates:   Allergen Reactions    Hydrocodone-acetaminophen Swelling     Pt can tolerate Percocet, but cannot take Norco  NO ISSUES WITH TAKING PERCOCET    Morphine Shortness Of Breath    Peanut     Shellfish containing products     Sulfamethoxazole-trimethoprim Hives and Itching    Iodine and iodide containing products Itching       PROVIDER TRIAGE NOTE  Patient presents with pain in the left hip secondary to fall. Prior dislocation of the hip and pelvis in the past.       ORDERS  Labs Reviewed   HIV 1 / 2 ANTIBODY   HEPATITIS C ANTIBODY       ED Orders (720h ago, onward)      Start Ordered     Status Ordering Provider    03/15/24 1224 03/15/24 1223  HIV 1/2 Ag/Ab (4th Gen)  STAT         Ordered MARGAUX JACQUES    03/15/24 1224 03/15/24 1223  Hepatitis C Antibody  STAT         Ordered MARGAUX JACQUES              Virtual Visit Note: The provider triage portion of this emergency department evaluation and documentation was performed via [employer], a HIPAA-compliant telemedicine application, in concert with a tele-presenter in the room. A face to face patient evaluation with one of my colleagues will occur once the patient is placed in an emergency department room.      DISCLAIMER: This note was prepared with [employer] voice recognition transcription software. Garbled syntax, mangled pronouns, and other bizarre constructions may be attributed to that software system.
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
## 2024-03-16 NOTE — PLAN OF CARE
JUNI faxed ambulatory referral to [facility] ( [phone])          Marty Martinez LMSW  Case Management  Emergency Department  [phone]

## 2024-03-22 ENCOUNTER — TELEPHONE (OUTPATIENT)
Dept: ORTHOPEDICS | Facility: CLINIC | Age: 39
End: 2024-03-22
Payer: MEDICAID

## 2024-04-01 ENCOUNTER — HOSPITAL ENCOUNTER (EMERGENCY)
Facility: HOSPITAL | Age: 39
Discharge: HOME | End: 2024-04-02
Attending: STUDENT IN AN ORGANIZED HEALTH CARE EDUCATION/TRAINING PROGRAM
Payer: MEDICAID

## 2024-04-01 DIAGNOSIS — R11.2 NAUSEA AND VOMITING, UNSPECIFIED VOMITING TYPE: Primary | ICD-10-CM

## 2024-04-01 PROCEDURE — 87389 HIV-1 AG W/HIV-1&-2 AB AG IA: CPT | Performed by: PHYSICIAN ASSISTANT

## 2024-04-01 PROCEDURE — 96374 THER/PROPH/DIAG INJ IV PUSH: CPT

## 2024-04-01 PROCEDURE — 86803 HEPATITIS C AB TEST: CPT | Performed by: PHYSICIAN ASSISTANT

## 2024-04-01 PROCEDURE — 80053 COMPREHEN METABOLIC PANEL: CPT

## 2024-04-01 PROCEDURE — 85025 COMPLETE CBC W/AUTO DIFF WBC: CPT

## 2024-04-01 PROCEDURE — 99284 EMERGENCY DEPT VISIT MOD MDM: CPT | Mod: 25

## 2024-04-01 PROCEDURE — 83690 ASSAY OF LIPASE: CPT

## 2024-04-01 PROCEDURE — 96361 HYDRATE IV INFUSION ADD-ON: CPT

## 2024-04-01 RX ORDER — ONDANSETRON HYDROCHLORIDE 2 MG/ML
4 INJECTION, SOLUTION INTRAVENOUS
Status: COMPLETED | OUTPATIENT
Start: 2024-04-02 | End: 2024-04-01

## 2024-04-01 RX ADMIN — ONDANSETRON 4 MG: 2 INJECTION INTRAMUSCULAR; INTRAVENOUS at 11:04

## 2024-04-01 RX ADMIN — SODIUM CHLORIDE 1000 ML: 9 INJECTION, SOLUTION INTRAVENOUS at 11:04

## 2024-04-02 VITALS
WEIGHT: 293 LBS | RESPIRATION RATE: 17 BRPM | TEMPERATURE: 98 F | DIASTOLIC BLOOD PRESSURE: 83 MMHG | OXYGEN SATURATION: 98 % | BODY MASS INDEX: 53.25 KG/M2 | SYSTOLIC BLOOD PRESSURE: 148 MMHG | HEART RATE: 78 BPM

## 2024-04-02 LAB
ALBUMIN SERPL BCP-MCNC: 3.1 G/DL (ref 3.5–5.2)
ALP SERPL-CCNC: 84 U/L (ref 55–135)
ALT SERPL W/O P-5'-P-CCNC: 8 U/L (ref 10–44)
ANION GAP SERPL CALC-SCNC: 10 MMOL/L (ref 8–16)
AST SERPL-CCNC: 13 U/L (ref 10–40)
BASOPHILS # BLD AUTO: 0.02 K/UL (ref 0–0.2)
BASOPHILS NFR BLD: 0.3 % (ref 0–1.9)
BILIRUB SERPL-MCNC: 0.3 MG/DL (ref 0.1–1)
BUN SERPL-MCNC: 16 MG/DL (ref 6–20)
CALCIUM SERPL-MCNC: 8.6 MG/DL (ref 8.7–10.5)
CHLORIDE SERPL-SCNC: 107 MMOL/L (ref 95–110)
CO2 SERPL-SCNC: 21 MMOL/L (ref 23–29)
CREAT SERPL-MCNC: 0.7 MG/DL (ref 0.5–1.4)
DIFFERENTIAL METHOD BLD: ABNORMAL
EOSINOPHIL # BLD AUTO: 0.4 K/UL (ref 0–0.5)
EOSINOPHIL NFR BLD: 6.2 % (ref 0–8)
ERYTHROCYTE [DISTWIDTH] IN BLOOD BY AUTOMATED COUNT: 16.6 % (ref 11.5–14.5)
EST. GFR  (NO RACE VARIABLE): >60 ML/MIN/1.73 M^2
GLUCOSE SERPL-MCNC: 89 MG/DL (ref 70–110)
HCT VFR BLD AUTO: 36.1 % (ref 37–48.5)
HCV AB SERPL QL IA: NORMAL
HGB BLD-MCNC: 11 G/DL (ref 12–16)
HIV 1+2 AB+HIV1 P24 AG SERPL QL IA: NORMAL
IMM GRANULOCYTES # BLD AUTO: 0.02 K/UL (ref 0–0.04)
IMM GRANULOCYTES NFR BLD AUTO: 0.3 % (ref 0–0.5)
LIPASE SERPL-CCNC: 22 U/L (ref 4–60)
LYMPHOCYTES # BLD AUTO: 1.7 K/UL (ref 1–4.8)
LYMPHOCYTES NFR BLD: 28.2 % (ref 18–48)
MCH RBC QN AUTO: 23.9 PG (ref 27–31)
MCHC RBC AUTO-ENTMCNC: 30.5 G/DL (ref 32–36)
MCV RBC AUTO: 78 FL (ref 82–98)
MONOCYTES # BLD AUTO: 0.4 K/UL (ref 0.3–1)
MONOCYTES NFR BLD: 5.7 % (ref 4–15)
NEUTROPHILS # BLD AUTO: 3.6 K/UL (ref 1.8–7.7)
NEUTROPHILS NFR BLD: 59.3 % (ref 38–73)
NRBC BLD-RTO: 0 /100 WBC
PLATELET # BLD AUTO: 251 K/UL (ref 150–450)
PMV BLD AUTO: 12 FL (ref 9.2–12.9)
POTASSIUM SERPL-SCNC: 3.6 MMOL/L (ref 3.5–5.1)
PROT SERPL-MCNC: 7.5 G/DL (ref 6–8.4)
RBC # BLD AUTO: 4.61 M/UL (ref 4–5.4)
SODIUM SERPL-SCNC: 138 MMOL/L (ref 136–145)
WBC # BLD AUTO: 6.13 K/UL (ref 3.9–12.7)

## 2024-04-02 PROCEDURE — 63600175 PHARM REV CODE 636 W HCPCS

## 2024-04-02 PROCEDURE — 25000003 PHARM REV CODE 250

## 2024-04-02 RX ORDER — ONDANSETRON 4 MG/1
4 TABLET, ORALLY DISINTEGRATING ORAL EVERY 6 HOURS PRN
Qty: 15 TABLET | Refills: 0 | Status: SHIPPED | OUTPATIENT
Start: 2024-04-02

## 2024-04-02 NOTE — ED PROVIDER NOTES
Source of History:  Patient    Chief complaint:  Vomiting (Diarrhea and vomiting x 1 day )      HPI:  Siddhartha Lombardo is a 38 y.o. female with history of asthma, obese body habitus, hypotension presents to the emergency department with a chief complaint of nausea and vomiting.  Patient was feeling well until approximately noon today.  Since noon she has had several episodes of nonbloody diarrhea as well as 4 episodes of nonbloody nonbilious emesis.  She denies fevers, but endorses chills.  She denies chest pain, shortness of breath, urinary symptoms.  She was in her menstrual cycle right now, and she would sometimes has cramping pain associated with her menstrual cycles.  She was unsure if this is what she was experiencing.  She has no other concerns at this time.    Review of patient's allergies indicates:   Allergen Reactions    Hydrocodone-acetaminophen Swelling     Pt can tolerate Percocet, but cannot take Norco  NO ISSUES WITH TAKING PERCOCET    Morphine Shortness Of Breath    Peanut     Shellfish containing products     Sulfamethoxazole-trimethoprim Hives and Itching    Iodine and iodide containing products Itching       No current facility-administered medications on file prior to encounter.     Current Outpatient Medications on File Prior to Encounter   Medication Sig Dispense Refill    albuterol (ACCUNEB) 0.63 mg/3 mL Nebu Take 0.63 mg by nebulization every 6 (six) hours as needed. Rescue      aspirin (ECOTRIN) 81 MG EC tablet Take 81 mg by mouth once daily.      diclofenac (VOLTAREN) 75 MG EC tablet Take 1 tablet (75 mg total) by mouth 2 (two) times daily. 60 tablet 0    dicloxacillin (DYNAPEN) 250 MG capsule Take 2 capsules (500 mg total) by mouth 4 (four) times daily. 14 capsule 0    EPINEPHrine (EPIPEN) 0.3 mg/0.3 mL AtIn Inject 0.3 mLs (0.3 mg total) into the muscle as needed. 1 Device 1    erythromycin (ROMYCIN) ophthalmic ointment Place a 1/2 inch ribbon of ointment into the lower eyelid. 1  each 0    labetaloL (NORMODYNE) 100 MG tablet Take 1 tablet (100 mg total) by mouth 2 (two) times daily. 60 tablet 0    meloxicam (MOBIC) 7.5 MG tablet Take 1 tablet (7.5 mg total) by mouth once daily. Take with food 7 tablet 0    NEXPLANON 68 mg Impl   0    ondansetron (ZOFRAN-ODT) 4 MG TbDL Take 2 tablets (8 mg total) by mouth every 8 (eight) hours as needed. 12 tablet 0    oxyCODONE-acetaminophen (PERCOCET) 5-325 mg per tablet Take 1 tablet by mouth every 6 (six) hours as needed for Pain. 12 tablet 0    prenatal 25/iron fum/folic/dha (PRENATAL-1 ORAL) Take by mouth.         PMH:  As per HPI and below:  Past Medical History:   Diagnosis Date    Asthma     Hypertension     Obesity      Past Surgical History:   Procedure Laterality Date    APPENDECTOMY  7/2013    BOWEL RESECTION  7/2013    BREAST SURGERY      right breast I&D x 3    TONSILLECTOMY         Social History     Socioeconomic History    Marital status: Single   Tobacco Use    Smoking status: Every Day     Current packs/day: 0.50     Average packs/day: 0.5 packs/day for 5.0 years (2.5 ttl pk-yrs)     Types: Cigarettes    Smokeless tobacco: Current   Substance and Sexual Activity    Alcohol use: No     Alcohol/week: 0.0 standard drinks of alcohol    Drug use: Yes     Types: Marijuana    Sexual activity: Never     Birth control/protection: Injection       Family History   Problem Relation Age of Onset    Breast cancer Other     Ovarian cancer Neg Hx        Physical Exam:      Vitals:    04/02/24 0131   BP: (!) 148/83   Pulse: 78   Resp: 17   Temp: 98.1 °F (36.7 °C)     Gen: No acute distress.  Nontoxic.  Well appearing.  Mental Status:  Alert and oriented .  Appropriate, conversant.  Skin: Warm, dry. No rashes seen.  Eyes: No conjunctival injection.  Pulm: No increased work of breathing.  No significant tachypnea.  No conversational dyspnea.    CV: Regular rate. Regular rhythm.   Abd: Soft.  Mildly tender to palpation in the left lower quadrant.  No rebound  tenderness.  No rigidity.  No distention.  No guarding.  Negative McBurney's point.  Negative heel tap   MSK:  No abnormalities noted  Neuro: Awake. Speech normal. No focal neuro deficit observed.      Laboratory Studies:  Labs Reviewed   CBC W/ AUTO DIFFERENTIAL - Abnormal; Notable for the following components:       Result Value    Hemoglobin 11.0 (*)     Hematocrit 36.1 (*)     MCV 78 (*)     MCH 23.9 (*)     MCHC 30.5 (*)     RDW 16.6 (*)     All other components within normal limits    Narrative:     Release to patient->Immediate   COMPREHENSIVE METABOLIC PANEL - Abnormal; Notable for the following components:    CO2 21 (*)     Calcium 8.6 (*)     Albumin 3.1 (*)     ALT 8 (*)     All other components within normal limits    Narrative:     Release to patient->Immediate   LIPASE    Narrative:     Release to patient->Immediate   HIV 1 / 2 ANTIBODY    Narrative:     Release to patient->Immediate   HEPATITIS C ANTIBODY    Narrative:     Release to patient->Immediate               Chart reviewed.  Patient has a history of GI problems including nausea and vomiting.  She had an EGD in January unable to access the results.    Imaging Results    None         Medications Given:  Medications   sodium chloride 0.9% bolus 1,000 mL 1,000 mL (0 mLs Intravenous Stopped 4/2/24 0113)   ondansetron injection 4 mg (4 mg Intravenous Given 4/1/24 2307)           MDM:    38 y.o. female with nausea, vomiting, diarrhea    Patient's CBC unremarkable.  Mildly anemic in line with the patient's baseline.  Lipase negative unlikely to be pancreatitis.  CMP unremarkable.  No signs of JOHN, biliary dysfunction, dehydration or transaminitis.  Patient symptoms has been relieved with Zofran and fluid bolus.  Patient able to tolerate oral fluid challenge without difficulty.  Patient presentation most consistent with gastritis.  Have provided return precautions and outpatient script for Zofran.        Diagnostic Impression:    1. Nausea and  vomiting, unspecified vomiting type         ED Disposition Condition    Discharge Stable          ED Prescriptions       Medication Sig Dispense Start Date End Date Auth. Provider    ondansetron (ZOFRAN-ODT) 4 MG TbDL Take 1 tablet (4 mg total) by mouth every 6 (six) hours as needed. 15 tablet 4/2/2024 -- Roman Romero MD          Follow-up Information    None         Patient and/or family understands the plan and is in agreement, verbalized understanding, questions answered    V Romie Romero MD  Resident  Emergency Medicine         Roman Romero MD  Resident  04/02/24 0133       Roman Romero MD  Resident  04/02/24 0136

## 2024-04-17 ENCOUNTER — TELEPHONE (OUTPATIENT)
Dept: ORTHOPEDICS | Facility: CLINIC | Age: 39
End: 2024-04-17
Payer: MEDICAID

## 2024-04-17 ENCOUNTER — HOSPITAL ENCOUNTER (EMERGENCY)
Facility: HOSPITAL | Age: 39
Discharge: HOME | End: 2024-04-17
Attending: EMERGENCY MEDICINE
Payer: MEDICAID

## 2024-04-17 VITALS
HEART RATE: 79 BPM | WEIGHT: 293 LBS | BODY MASS INDEX: 48.82 KG/M2 | SYSTOLIC BLOOD PRESSURE: 160 MMHG | DIASTOLIC BLOOD PRESSURE: 100 MMHG | RESPIRATION RATE: 18 BRPM | OXYGEN SATURATION: 100 % | HEIGHT: 65 IN | TEMPERATURE: 98 F

## 2024-04-17 DIAGNOSIS — L29.9 ITCHING: Primary | ICD-10-CM

## 2024-04-17 PROCEDURE — 99284 EMERGENCY DEPT VISIT MOD MDM: CPT

## 2024-04-17 RX ORDER — HYDROXYZINE PAMOATE 25 MG/1
25 CAPSULE ORAL EVERY 8 HOURS PRN
Qty: 20 CAPSULE | Refills: 0 | Status: SHIPPED | OUTPATIENT
Start: 2024-04-17

## 2024-04-17 RX ORDER — QUETIAPINE FUMARATE 200 MG/1
200 TABLET, FILM COATED ORAL NIGHTLY
Qty: 30 TABLET | Refills: 0 | Status: SHIPPED | OUTPATIENT
Start: 2024-04-17 | End: 2025-04-17

## 2024-04-17 RX ORDER — FLUCONAZOLE 150 MG/1
150 TABLET ORAL DAILY
Qty: 1 TABLET | Refills: 0 | Status: SHIPPED | OUTPATIENT
Start: 2024-04-17 | End: 2024-04-18

## 2024-04-17 NOTE — TELEPHONE ENCOUNTER
Left message for patient stating that Dr Manley specializes in hip and knee replacements and only takes patients 45 and older in [place] with Medicaid
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
## 2024-04-17 NOTE — ED PROVIDER NOTES
Encounter Date: 4/17/2024       History     Chief Complaint   Patient presents with    Allergic Reaction     Seen at [facility] earlier today - given epi pen at ER  Pt reaction due to Percocet   Pt reports itching all over.  Took 3 Benadryl at 7am.      38-year-old female with a history of asthma, hypertension, obesity, anxiety, depression and bipolar disorder presents to the ER complaining of diffuse itching.  She reports itching and bumps on her right forearm under her breasts and in her groin.  She reports being seen at 2 different ERs recently for the same complaints.  Suspects that she is having allergic reaction to Percocet prescribed several days ago.  On 1 visit she was given an injection from an EpiPen, IV steroids IV Benadryl and IV Pepcid.  She went back last night to another emergency room and was treated with IV steroids IV Benadryl and IV Pepcid no reports no improvement.  She also reports chronic hip pain.  Also reports being out of her Seroquel and needing a refill of the Seroquel and also requesting refill of muscle relaxants.  [facility] primary care will not provide these any longer.  No difficulty swallowing no shortness of breath no wheezing.    The history is provided by the patient.     Review of patient's allergies indicates:   Allergen Reactions    Hydrocodone-acetaminophen Swelling     Pt can tolerate Percocet, but cannot take Norco  NO ISSUES WITH TAKING PERCOCET    Morphine Shortness Of Breath    Peanut     Shellfish containing products     Sulfamethoxazole-trimethoprim Hives and Itching    Iodine and iodide containing products Itching     Past Medical History:   Diagnosis Date    Asthma     Hypertension     Obesity      Past Surgical History:   Procedure Laterality Date    APPENDECTOMY  7/2013    BOWEL RESECTION  7/2013    BREAST SURGERY      right breast I&D x 3    TONSILLECTOMY       Family History   Problem Relation Name Age of Onset    Breast cancer Other P Great Aunt     Ovarian cancer Neg  Hx       Social History     Tobacco Use    Smoking status: Every Day     Current packs/day: 0.50     Average packs/day: 0.5 packs/day for 5.0 years (2.5 ttl pk-yrs)     Types: Cigarettes    Smokeless tobacco: Current   Substance Use Topics    Alcohol use: No     Alcohol/week: 0.0 standard drinks of alcohol    Drug use: Yes     Types: Marijuana     Review of Systems   HENT: Negative.     Eyes:  Negative for itching.   Respiratory:  Negative for shortness of breath and wheezing.    Musculoskeletal:  Positive for arthralgias.   Skin:  Positive for rash.       Physical Exam     Initial Vitals [04/17/24 0802]   BP Pulse Resp Temp SpO2   (!) 160/100 79 18 98.4 °F (36.9 °C) 100 %      MAP       --         Physical Exam    Nursing note and vitals reviewed.  Constitutional: She appears well-developed and well-nourished. She is not diaphoretic. No distress.   HENT:   Head: Normocephalic and atraumatic.   Mouth/Throat: Oropharynx is clear and moist. No oropharyngeal exudate.   There is no oropharyngeal swelling   Eyes: EOM are normal.   Neck: Neck supple.   Normal range of motion.  Cardiovascular:  Normal rate, regular rhythm and normal heart sounds.     Exam reveals no gallop and no friction rub.       No murmur heard.  Pulmonary/Chest: Breath sounds normal. No respiratory distress. She has no wheezes. She has no rhonchi. She has no rales.   Abdominal:   Midline abdominal surgical scar   Musculoskeletal:         General: Normal range of motion.      Cervical back: Normal range of motion and neck supple.     Neurological: She is alert and oriented to person, place, and time.   Skin: Skin is warm and dry. Rash noted.   Very small papular rash on the right forearm, rash under both breasts   Psychiatric: She has a normal mood and affect. Her behavior is normal. Judgment and thought content normal.         ED Course   Procedures  Labs Reviewed - No data to display       Imaging Results    None          Medications - No data to  display  Medical Decision Making  Patient presents with multiple complaints primarily itching and rash but also reports needing her Seroquel refilled and chronic hip pain and also she wants muscle relaxants which her primary care provider will not fill.  She was seen at 2 other emergency rooms recently.  She states her itching has continued.  In my opinion her rash is not consistent with anaphylaxis despite her opinion that it is.  She has no evidence at all of anaphylaxis.  Oropharyngeal exam is normal she has no tongue swelling she has no urticaria she has no wheezing.  She has a papular rash on the forearm of uncertain etiology and also has a rash under both breasts likely fungal.  She is demanding to see an orthopedist.  She is demanding blood work and skin testing to make sure that she is not having an acute allergic reaction.  I have advised her that she does not need any of this care.  She is very upset and is demanding to speak with the hospital .  She can be discharged, home there is no indication at all at this time of any life-threatening emergency.  The hip pain is chronic and well documented with multiple previous visits to [facility].  She does not see an orthopedist emergently.  She does not need any testing at all.    Amount and/or Complexity of Data Reviewed  External Data Reviewed: notes.     Details: Recent ER visits reviewed    Risk  Prescription drug management.               ED Course as of 04/17/24 0921 Wed Apr 17, 2024   0844 BP(!): 160/100 [EF]   0844 Temp: 98.4 °F (36.9 °C) [EF]   0844 Temp Source: Oral [EF]   0844 Pulse: 79 [EF]   0844 Resp: 18 [EF]   0844 SpO2: 100 % [EF]      ED Course User Index  [EF] Ger Aponte MD                           Clinical Impression:  Final diagnoses:  [L29.9] Itching (Primary)          ED Disposition Condition    Discharge Stable          ED Prescriptions       Medication Sig Dispense Start Date End Date Auth. Provider     hydrOXYzine pamoate (VISTARIL) 25 MG Cap Take 1 capsule (25 mg total) by mouth every 8 (eight) hours as needed (itching). 20 capsule 4/17/2024 -- Ger Aponte MD    QUEtiapine (SEROQUEL) 200 MG Tab Take 1 tablet (200 mg total) by mouth every evening. 30 tablet 4/17/2024 4/17/2025 Ger Aponte MD    fluconazole (DIFLUCAN) 150 MG Tab Take 1 tablet (150 mg total) by mouth once daily. for 1 day 1 tablet 4/17/2024 4/18/2024 Ger Aponte MD          Follow-up Information       Follow up With Specialties Details Why Contact Info    Karen Antonio MD Internal Medicine Schedule an appointment as soon as possible for a visit   [address] [address]  [phone]               Ger Aponte MD  04/17/24 4855

## 2024-04-21 ENCOUNTER — HOSPITAL ENCOUNTER (EMERGENCY)
Facility: HOSPITAL | Age: 39
Discharge: HOME | End: 2024-04-21
Attending: EMERGENCY MEDICINE
Payer: COMMERCIAL

## 2024-04-21 VITALS
DIASTOLIC BLOOD PRESSURE: 98 MMHG | HEART RATE: 98 BPM | WEIGHT: 293 LBS | SYSTOLIC BLOOD PRESSURE: 139 MMHG | RESPIRATION RATE: 16 BRPM | OXYGEN SATURATION: 100 % | BODY MASS INDEX: 48.82 KG/M2 | HEIGHT: 65 IN | TEMPERATURE: 98 F

## 2024-04-21 DIAGNOSIS — M54.2 NECK PAIN ON LEFT SIDE: ICD-10-CM

## 2024-04-21 DIAGNOSIS — M25.572 ACUTE LEFT ANKLE PAIN: ICD-10-CM
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
## 2024-04-21 DIAGNOSIS — M25.552 LEFT HIP PAIN: ICD-10-CM

## 2024-04-21 DIAGNOSIS — M79.651 ACUTE PAIN OF RIGHT THIGH: ICD-10-CM

## 2024-04-21 DIAGNOSIS — I10 ELEVATED BLOOD PRESSURE READING WITH DIAGNOSIS OF HYPERTENSION: ICD-10-CM

## 2024-04-21 PROCEDURE — 99284 EMERGENCY DEPT VISIT MOD MDM: CPT | Mod: 25

## 2024-04-21 RX ORDER — GABAPENTIN 300 MG/1
300 CAPSULE ORAL 3 TIMES DAILY
Qty: 30 CAPSULE | Refills: 0 | Status: SHIPPED | OUTPATIENT
Start: 2024-04-21 | End: 2024-05-01

## 2024-04-21 RX ORDER — METHOCARBAMOL 500 MG/1
500 TABLET, FILM COATED ORAL
Status: DISCONTINUED | OUTPATIENT
Start: 2024-04-21 | End: 2024-04-21
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
## 2024-04-21 NOTE — ED NOTES
Called pt by phone after walking ER waiting room and outside, pt very upset because she waited after the last call and wasn't found, still havent seen pt
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
## 2024-05-01 ENCOUNTER — HOSPITAL ENCOUNTER (EMERGENCY)
Facility: OTHER | Age: 39
Discharge: LEFT BEFORE DISCHARGE | End: 2024-05-01
Payer: COMMERCIAL

## 2024-05-01 VITALS
HEART RATE: 88 BPM | DIASTOLIC BLOOD PRESSURE: 104 MMHG | TEMPERATURE: 100 F | SYSTOLIC BLOOD PRESSURE: 166 MMHG | RESPIRATION RATE: 20 BRPM | OXYGEN SATURATION: 99 %

## 2024-05-01 DIAGNOSIS — T14.90XA INJURY: ICD-10-CM
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
## 2024-05-01 LAB
ALBUMIN SERPL BCP-MCNC: 3.1 G/DL (ref 3.5–5.2)
ALP SERPL-CCNC: 80 U/L (ref 55–135)
ALT SERPL W/O P-5'-P-CCNC: 8 U/L (ref 10–44)
ANION GAP SERPL CALC-SCNC: 10 MMOL/L (ref 8–16)
AST SERPL-CCNC: 13 U/L (ref 10–40)
B-HCG UR QL: NEGATIVE
BASOPHILS # BLD AUTO: 0.03 K/UL (ref 0–0.2)
BASOPHILS NFR BLD: 0.3 % (ref 0–1.9)
BILIRUB SERPL-MCNC: 0.4 MG/DL (ref 0.1–1)
BUN SERPL-MCNC: 15 MG/DL (ref 6–20)
CALCIUM SERPL-MCNC: 8.7 MG/DL (ref 8.7–10.5)
CHLORIDE SERPL-SCNC: 108 MMOL/L (ref 95–110)
CO2 SERPL-SCNC: 21 MMOL/L (ref 23–29)
CREAT SERPL-MCNC: 0.8 MG/DL (ref 0.5–1.4)
CTP QC/QA: YES
DIFFERENTIAL METHOD BLD: ABNORMAL
EOSINOPHIL # BLD AUTO: 0.2 K/UL (ref 0–0.5)
EOSINOPHIL NFR BLD: 2.1 % (ref 0–8)
ERYTHROCYTE [DISTWIDTH] IN BLOOD BY AUTOMATED COUNT: 16.1 % (ref 11.5–14.5)
EST. GFR  (NO RACE VARIABLE): >60 ML/MIN/1.73 M^2
GLUCOSE SERPL-MCNC: 108 MG/DL (ref 70–110)
HCT VFR BLD AUTO: 35.1 % (ref 37–48.5)
HGB BLD-MCNC: 10.7 G/DL (ref 12–16)
IMM GRANULOCYTES # BLD AUTO: 0.02 K/UL (ref 0–0.04)
IMM GRANULOCYTES NFR BLD AUTO: 0.2 % (ref 0–0.5)
LYMPHOCYTES # BLD AUTO: 2 K/UL (ref 1–4.8)
LYMPHOCYTES NFR BLD: 23.6 % (ref 18–48)
MCH RBC QN AUTO: 23.6 PG (ref 27–31)
MCHC RBC AUTO-ENTMCNC: 30.5 G/DL (ref 32–36)
MCV RBC AUTO: 78 FL (ref 82–98)
MONOCYTES # BLD AUTO: 0.5 K/UL (ref 0.3–1)
MONOCYTES NFR BLD: 5.3 % (ref 4–15)
NEUTROPHILS # BLD AUTO: 5.9 K/UL (ref 1.8–7.7)
NEUTROPHILS NFR BLD: 68.5 % (ref 38–73)
NRBC BLD-RTO: 0 /100 WBC
PLATELET # BLD AUTO: 314 K/UL (ref 150–450)
PMV BLD AUTO: 11 FL (ref 9.2–12.9)
POTASSIUM SERPL-SCNC: 3.8 MMOL/L (ref 3.5–5.1)
PROT SERPL-MCNC: 7.4 G/DL (ref 6–8.4)
RBC # BLD AUTO: 4.53 M/UL (ref 4–5.4)
SODIUM SERPL-SCNC: 139 MMOL/L (ref 136–145)
WBC # BLD AUTO: 8.6 K/UL (ref 3.9–12.7)

## 2024-05-01 PROCEDURE — 80053 COMPREHEN METABOLIC PANEL: CPT | Performed by: INTERNAL MEDICINE

## 2024-05-01 PROCEDURE — 99284 EMERGENCY DEPT VISIT MOD MDM: CPT | Mod: 25

## 2024-05-01 PROCEDURE — 81003 URINALYSIS AUTO W/O SCOPE: CPT | Performed by: INTERNAL MEDICINE

## 2024-05-01 PROCEDURE — 85025 COMPLETE CBC W/AUTO DIFF WBC: CPT | Performed by: INTERNAL MEDICINE

## 2024-05-01 PROCEDURE — 81025 URINE PREGNANCY TEST: CPT | Performed by: INTERNAL MEDICINE

## 2024-05-01 RX ORDER — METHYLPREDNISOLONE SOD SUCC 125 MG
125 VIAL (EA) INJECTION
Status: DISCONTINUED | OUTPATIENT
Start: 2024-05-01 | End: 2024-05-02 | Stop reason: HOSPADM

## 2024-05-02 LAB
BILIRUB UR QL STRIP: NEGATIVE
CLARITY UR: CLEAR
COLOR UR: YELLOW
GLUCOSE UR QL STRIP: NEGATIVE
HGB UR QL STRIP: NEGATIVE
KETONES UR QL STRIP: NEGATIVE
LEUKOCYTE ESTERASE UR QL STRIP: NEGATIVE
NITRITE UR QL STRIP: NEGATIVE
PH UR STRIP: 7 [PH] (ref 5–8)
PROT UR QL STRIP: NEGATIVE
SP GR UR STRIP: 1.02 (ref 1–1.03)
URN SPEC COLLECT METH UR: ABNORMAL
UROBILINOGEN UR STRIP-ACNC: ABNORMAL EU/DL

## 2024-05-02 NOTE — ED NOTES
Mother has an attitude  because I asked the her child to stop pushing button on the machine and removed her hand.
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
## 2024-05-29 ENCOUNTER — TELEPHONE (OUTPATIENT)
Dept: OBSTETRICS AND GYNECOLOGY | Facility: CLINIC | Age: 39
End: 2024-05-29
Payer: MEDICAID

## 2024-05-29 NOTE — TELEPHONE ENCOUNTER
----- Message from Larissa Torres sent at 5/29/2024  1:48 PM CDT -----  Contact: [phone]  Caller is requesting an earlier appointment then we can schedule.  Caller is requesting a message be sent to the provider.  If this is for urgent care symptoms, did you offer other providers at this location, providers at other locations, or [facility]? (yes, no, n/a):  no  If this is for the patients physical, did you offer to schedule next available and put on wait list, or to see NP or PA for their physical?  (yes, no, n/a):  no  When is the next available appointment with their provider:  unknown  Reason for the appointment:  new pt appt   Patient preference of timeframe to be scheduled:  asap  Would the patient like a call back, or a response through their [facility] portal?:  Call back     Please call pt and advise

## 2024-06-04 ENCOUNTER — HOSPITAL ENCOUNTER (EMERGENCY)
Facility: HOSPITAL | Age: 39
Discharge: HOME | End: 2024-06-04
Attending: EMERGENCY MEDICINE
Payer: MEDICAID

## 2024-06-04 VITALS
BODY MASS INDEX: 46.65 KG/M2 | OXYGEN SATURATION: 97 % | DIASTOLIC BLOOD PRESSURE: 103 MMHG | WEIGHT: 280 LBS | SYSTOLIC BLOOD PRESSURE: 154 MMHG | RESPIRATION RATE: 18 BRPM | HEART RATE: 86 BPM | TEMPERATURE: 98 F | HEIGHT: 65 IN

## 2024-06-04 DIAGNOSIS — M54.2 NECK PAIN: ICD-10-CM

## 2024-06-04 DIAGNOSIS — M25.552 LEFT HIP PAIN: Primary | ICD-10-CM
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
## 2024-06-04 LAB
ALBUMIN SERPL BCP-MCNC: 3 G/DL (ref 3.5–5.2)
ALP SERPL-CCNC: 89 U/L (ref 55–135)
ALT SERPL W/O P-5'-P-CCNC: 8 U/L (ref 10–44)
ANION GAP SERPL CALC-SCNC: 8 MMOL/L (ref 8–16)
ANISOCYTOSIS BLD QL SMEAR: SLIGHT
AST SERPL-CCNC: 11 U/L (ref 10–40)
BASOPHILS # BLD AUTO: 0.03 K/UL (ref 0–0.2)
BASOPHILS NFR BLD: 0.6 % (ref 0–1.9)
BILIRUB SERPL-MCNC: 0.3 MG/DL (ref 0.1–1)
BUN SERPL-MCNC: 14 MG/DL (ref 6–20)
CALCIUM SERPL-MCNC: 8.8 MG/DL (ref 8.7–10.5)
CHLORIDE SERPL-SCNC: 106 MMOL/L (ref 95–110)
CO2 SERPL-SCNC: 26 MMOL/L (ref 23–29)
CREAT SERPL-MCNC: 0.7 MG/DL (ref 0.5–1.4)
CRP SERPL-MCNC: 11 MG/L (ref 0–8.2)
DACRYOCYTES BLD QL SMEAR: ABNORMAL
DIFFERENTIAL METHOD BLD: ABNORMAL
DOHLE BOD BLD QL SMEAR: PRESENT
EOSINOPHIL # BLD AUTO: 0.3 K/UL (ref 0–0.5)
EOSINOPHIL NFR BLD: 5.4 % (ref 0–8)
ERYTHROCYTE [DISTWIDTH] IN BLOOD BY AUTOMATED COUNT: 15.9 % (ref 11.5–14.5)
EST. GFR  (NO RACE VARIABLE): >60 ML/MIN/1.73 M^2
GIANT PLATELETS BLD QL SMEAR: PRESENT
GLUCOSE SERPL-MCNC: 83 MG/DL (ref 70–110)
HCT VFR BLD AUTO: 35.2 % (ref 37–48.5)
HGB BLD-MCNC: 10.9 G/DL (ref 12–16)
IMM GRANULOCYTES # BLD AUTO: 0.01 K/UL (ref 0–0.04)
IMM GRANULOCYTES NFR BLD AUTO: 0.2 % (ref 0–0.5)
LYMPHOCYTES # BLD AUTO: 1.8 K/UL (ref 1–4.8)
LYMPHOCYTES NFR BLD: 32.8 % (ref 18–48)
MCH RBC QN AUTO: 23.7 PG (ref 27–31)
MCHC RBC AUTO-ENTMCNC: 31 G/DL (ref 32–36)
MCV RBC AUTO: 77 FL (ref 82–98)
MONOCYTES # BLD AUTO: 0.3 K/UL (ref 0.3–1)
MONOCYTES NFR BLD: 6.2 % (ref 4–15)
NEUTROPHILS # BLD AUTO: 2.9 K/UL (ref 1.8–7.7)
NEUTROPHILS NFR BLD: 54.8 % (ref 38–73)
NRBC BLD-RTO: 0 /100 WBC
OVALOCYTES BLD QL SMEAR: ABNORMAL
PLATELET # BLD AUTO: 294 K/UL (ref 150–450)
PLATELET BLD QL SMEAR: ABNORMAL
PMV BLD AUTO: 11.8 FL (ref 9.2–12.9)
POIKILOCYTOSIS BLD QL SMEAR: SLIGHT
POLYCHROMASIA BLD QL SMEAR: ABNORMAL
POTASSIUM SERPL-SCNC: 3.8 MMOL/L (ref 3.5–5.1)
PROT SERPL-MCNC: 7.3 G/DL (ref 6–8.4)
RBC # BLD AUTO: 4.59 M/UL (ref 4–5.4)
SCHISTOCYTES BLD QL SMEAR: ABNORMAL
SCHISTOCYTES BLD QL SMEAR: PRESENT
SODIUM SERPL-SCNC: 140 MMOL/L (ref 136–145)
SPHEROCYTES BLD QL SMEAR: ABNORMAL
TOXIC GRANULES BLD QL SMEAR: PRESENT
WBC # BLD AUTO: 5.36 K/UL (ref 3.9–12.7)

## 2024-06-04 PROCEDURE — 80053 COMPREHEN METABOLIC PANEL: CPT | Performed by: PHYSICIAN ASSISTANT

## 2024-06-04 PROCEDURE — A9585 GADOBUTROL INJECTION: HCPCS | Performed by: EMERGENCY MEDICINE

## 2024-06-04 PROCEDURE — 99285 EMERGENCY DEPT VISIT HI MDM: CPT | Mod: 25

## 2024-06-04 PROCEDURE — 25500020 PHARM REV CODE 255: Performed by: EMERGENCY MEDICINE

## 2024-06-04 PROCEDURE — 96372 THER/PROPH/DIAG INJ SC/IM: CPT | Performed by: PHYSICIAN ASSISTANT

## 2024-06-04 PROCEDURE — 85025 COMPLETE CBC W/AUTO DIFF WBC: CPT | Performed by: PHYSICIAN ASSISTANT

## 2024-06-04 PROCEDURE — 25000003 PHARM REV CODE 250: Performed by: PHYSICIAN ASSISTANT

## 2024-06-04 PROCEDURE — 25000003 PHARM REV CODE 250

## 2024-06-04 PROCEDURE — 63600175 PHARM REV CODE 636 W HCPCS: Performed by: PHYSICIAN ASSISTANT

## 2024-06-04 PROCEDURE — 86140 C-REACTIVE PROTEIN: CPT | Performed by: PHYSICIAN ASSISTANT

## 2024-06-04 RX ORDER — GADOBUTROL 604.72 MG/ML
10 INJECTION INTRAVENOUS
Status: COMPLETED | OUTPATIENT
Start: 2024-06-04 | End: 2024-06-04

## 2024-06-04 RX ORDER — IBUPROFEN 600 MG/1
600 TABLET ORAL EVERY 6 HOURS PRN
Qty: 20 TABLET | Refills: 0 | Status: SHIPPED | OUTPATIENT
Start: 2024-06-04

## 2024-06-04 RX ORDER — OXYCODONE HYDROCHLORIDE 5 MG/1
5 TABLET ORAL
Status: COMPLETED | OUTPATIENT
Start: 2024-06-04 | End: 2024-06-04

## 2024-06-04 RX ORDER — OXYCODONE HYDROCHLORIDE 5 MG/1
5 TABLET ORAL EVERY 4 HOURS PRN
Qty: 8 TABLET | Refills: 0 | Status: SHIPPED | OUTPATIENT
Start: 2024-06-04

## 2024-06-04 RX ORDER — METHOCARBAMOL 500 MG/1
500 TABLET, FILM COATED ORAL
Status: COMPLETED | OUTPATIENT
Start: 2024-06-04 | End: 2024-06-04

## 2024-06-04 RX ORDER — KETOROLAC TROMETHAMINE 30 MG/ML
15 INJECTION, SOLUTION INTRAMUSCULAR; INTRAVENOUS
Status: COMPLETED | OUTPATIENT
Start: 2024-06-04 | End: 2024-06-04

## 2024-06-04 RX ORDER — ACETAMINOPHEN 325 MG/1
650 TABLET ORAL
Status: DISCONTINUED | OUTPATIENT
Start: 2024-06-04 | End: 2024-06-04 | Stop reason: HOSPADM

## 2024-06-04 RX ADMIN — OXYCODONE 5 MG: 5 TABLET ORAL at 05:06

## 2024-06-04 RX ADMIN — METHOCARBAMOL 500 MG: 500 TABLET ORAL at 11:06

## 2024-06-04 RX ADMIN — KETOROLAC TROMETHAMINE 15 MG: 30 INJECTION, SOLUTION INTRAMUSCULAR at 11:06

## 2024-06-04 RX ADMIN — GADOBUTROL 10 ML: 604.72 INJECTION INTRAVENOUS at 07:06

## 2024-06-04 NOTE — ED TRIAGE NOTES
Arrives via POV stating she fell at [facility] yesterday afternoon. Pt endorses hitting back of head. Denies LOC or blood thinners. Pt states she slipped and fell from water on the ground. Pt states the back of her buttocks hurts from landing her buttocks. Pt states she is non weight baring at baseline and uses a walker at baseline from previous hip injury.

## 2024-06-04 NOTE — ED PROVIDER NOTES
Encounter Date: 6/4/2024       History     Chief Complaint   Patient presents with    Hip Pain     Left hip pain after she fell in the bathroom at [employer] yesterday. Slip and fall onto buttocks. Hx of a hip surgery 7/23.     38-year-old female with past medical history of chronic pain SP left total hip replacement, obesity, asthma presents to the ED for evaluation after a fall.  States she had a slip and fall on a wet floor yesterday on [employer].  Fell backwards hit the back of her head and that is on her buttocks.  Denies any blurred vision, LOC, nausea vomiting or blood thinner use.  Complains of pain to the left side of her neck, sacrum and left hip.    The history is provided by the patient.     Review of patient's allergies indicates:   Allergen Reactions    Hydrocodone-acetaminophen Swelling     Pt can tolerate Percocet, but cannot take Norco  NO ISSUES WITH TAKING PERCOCET    Morphine Shortness Of Breath    Peanut     Shellfish containing products     Sulfamethoxazole-trimethoprim Hives and Itching    Iodine and iodide containing products Itching     Past Medical History:   Diagnosis Date    Asthma     Chronic hip pain after total replacement of left hip joint     Hypertension     Obesity      Past Surgical History:   Procedure Laterality Date    APPENDECTOMY  07/01/2013    BILATERAL TUBAL LIGATION      BOWEL RESECTION  07/01/2013    BREAST SURGERY      right breast I&D x 3    HIP FRACTURE SURGERY Left     Left hip surgery x 2    KNEE SURGERY Left     TONSILLECTOMY       Family History   Problem Relation Name Age of Onset    Breast cancer Other P Great Aunt     Ovarian cancer Neg Hx       Social History     Tobacco Use    Smoking status: Every Day     Current packs/day: 0.50     Average packs/day: 0.5 packs/day for 5.0 years (2.5 ttl pk-yrs)     Types: Cigarettes    Smokeless tobacco: Current   Substance Use Topics    Alcohol use: No     Alcohol/week: 0.0 standard drinks of alcohol    Drug use: Yes      Types: Marijuana     Review of Systems   Constitutional:  Negative for chills and fever.   HENT:  Negative for sore throat.    Respiratory:  Negative for cough and shortness of breath.    Cardiovascular:  Negative for chest pain.   Gastrointestinal:  Negative for abdominal pain.   Genitourinary:  Negative for difficulty urinating.   Musculoskeletal:  Positive for arthralgias.   Neurological:  Negative for weakness.       Physical Exam     Initial Vitals [06/04/24 1104]   BP Pulse Resp Temp SpO2   (!) 134/93 87 18 98.3 °F (36.8 °C) 100 %      MAP       --         Physical Exam    Nursing note and vitals reviewed.  Constitutional: She appears well-developed and well-nourished.   HENT:   Head: Normocephalic and atraumatic.   Eyes: Conjunctivae are normal. Pupils are equal, round, and reactive to light.   Neck: Neck supple.   No midline cervical tenderness step-offs.  Mild left paraspinal tenderness.   Normal range of motion.  Cardiovascular:  Normal rate.           Pulmonary/Chest: Breath sounds normal.   Abdominal: Abdomen is soft. There is no abdominal tenderness.   Musculoskeletal:         General: Normal range of motion.      Cervical back: Normal range of motion and neck supple.      Comments: No midline T or L-spine tenderness.  Left lower lumbar tenderness with tenderness over the sacrum.  He was also left hip tenderness.  No hematoma noted.     Neurological: She is alert and oriented to person, place, and time. GCS score is 15. GCS eye subscore is 4. GCS verbal subscore is 5. GCS motor subscore is 6.   Skin: Skin is warm and dry. Capillary refill takes less than 2 seconds.         ED Course   Procedures  Labs Reviewed   CBC W/ AUTO DIFFERENTIAL - Abnormal; Notable for the following components:       Result Value    Hemoglobin 10.9 (*)     Hematocrit 35.2 (*)     MCV 77 (*)     MCH 23.7 (*)     MCHC 31.0 (*)     RDW 15.9 (*)     All other components within normal limits   COMPREHENSIVE METABOLIC PANEL -  Abnormal; Notable for the following components:    Albumin 3.0 (*)     ALT 8 (*)     All other components within normal limits   C-REACTIVE PROTEIN - Abnormal; Notable for the following components:    CRP 11.0 (*)     All other components within normal limits          Imaging Results               MRI Hip W WO Contrast Left (Final result)  Result time 06/04/24 19:52:44      Final result by Taco Miranda MD (06/04/24 19:52:44)                   Impression:      Enhancing synovium with fluid-filled femoroacetabular joint.  While this could represent inflammatory change and synovial hypertrophy from synovial osteochondromatosis, synovitis or septic joint are difficult to exclude by MR.    No bone marrow edema or bone enhancement to suggest osteomyelitis.    This report was flagged in Epic as abnormal.      Electronically signed by: Taco Miranda  Date:    06/04/2024  Time:    19:52               Narrative:    EXAMINATION:  MRI HIP W WO CONTRAST LEFT    CLINICAL HISTORY:  Fracture, hip;Septic arthritis suspected, hip, xray done;    TECHNIQUE:  Multiplanar multisequence MR imaging of the left hip were performed both prior to and following the administration of 10 cc of Gadavist gadolinium.    COMPARISON:  Plain x-ray, 06/04/2024 12:53 hours, CT of the hip, 03/15/2024    FINDINGS:  Postoperative changes of the femoroacetabular joint with osteotomy of the femoral head on the left is again noted.  The multiple bone fragments with marrow type signal at the center of the fragments are again noted.  Enhancement of the synovium is noted with prominence and thickening of the synovium.  No bone marrow edema or bone marrow enhancement.  The adjacent bones within the visualized pelvis appear intact.    Edema in the adjacent gluteal muscles may be reactive with no soft tissue mass or fluid collection.  Atrophy in the gluteus jaxon muscle is noted.  Subcutaneous regions appear normal.  No adenopathy.    The urinary  bladder, visualized GI and  tract appear unremarkable.  Artifact from orthopedic hardware.                                       X-Ray Cervical Spine AP And Lateral (Final result)  Result time 06/04/24 13:40:25      Final result by Dionicio Jones MD (06/04/24 13:40:25)                   Impression:      Disc narrowing and marginal vertebral endplate spurring are again noted at the C6-7 level.  No detrimental interval change since 04/21/2024 is appreciated.  No conventional radiographic evidence of fracture.      Electronically signed by: Dionicio Jones MD  Date:    06/04/2024  Time:    13:40               Narrative:    EXAMINATION:  XR CERVICAL SPINE AP LATERAL    TECHNIQUE:  Three views of the cervical spine were obtained, with AP, lateral, and AP odontoid projections submitted.    COMPARISON:  Comparison is made to 04/21/2024.  Clinical information of trauma/fall on 06/03/2024.    FINDINGS:  Some straightening of the normal cervical lordosis is seen, but no significant alignment abnormality is observed.  No atlantoaxial subluxation.  Vertebral body heights are normally maintained, without compression deformity at any level.  There is moderate disc narrowing and some marginal vertebral endplate spurring again noted at the C6-7 level, with the cervical discs superior to C6-7 appearing normally maintained.  Vertebral endplates are well defined.  No evidence of recent fracture, lytic destructive process, or prevertebral soft tissue swelling is observed.                                       X-Ray Hips Bilateral 2 View Incl AP Pelvis (Final result)  Result time 06/04/24 13:55:53      Final result by Vitaliy Rasheed MD (06/04/24 13:55:53)                   Impression:      1. Migration of fractured screw as compared to the previous examination, the screw fragment now projects at the level of the left femoroacetabular joint.  There has been some interval cortication about the residual proximal femur, limited evaluation  given motion.  Sequela of infection is not excluded, correlation needed.  Likewise, fracture would be difficult to exclude involving the left femoroacetabular joint.      Electronically signed by: Vitaliy Rasheed MD  Date:    06/04/2024  Time:    13:55               Narrative:    EXAMINATION:  XR HIPS BILATERAL 2 VIEW INCL AP PELVIS    CLINICAL HISTORY:  Unspecified fall, initial encounter    TECHNIQUE:  AP view of the pelvis and frogleg lateral views of both hips were performed.    COMPARISON:  03/15/2024    FINDINGS:  Three views bilateral hips.    Please note, motion blurring and habitus/exposure limit evaluation.    Allowing for the above, the bilateral sacroiliac joints appear intact noting left fixation screws.  The right femoroacetabular joint appears intact.  There is surgical change of the left acetabulum, plate and screw constructs are again noted.  There is a fractured screw, differing in position than on the previous examination, now projected at the left femoroacetabular joint.  There is surgical change/prior traumatic change involving the left femoral head noting similar orientation with the acetabulum.  Interval fracture cannot be definitively excluded given image quality.                                       X-Ray Sacrum And Coccyx (Final result)  Result time 06/04/24 13:45:28      Final result by Dionicio Jones MD (06/04/24 13:45:28)                   Impression:      As above      Electronically signed by: Dionicio Jones MD  Date:    06/04/2024  Time:    13:45               Narrative:    EXAMINATION:  XR SACRUM AND COCCYX    TECHNIQUE:  Three views were obtained.    COMPARISON:  Comparison is made to the examination of the pelvis dated 05/01/2024.  Clinical information obtained from the electronic medical record indicates trauma/fall on 06/03/2024.    FINDINGS:  Postoperative changes are again identified relating to prior internal fixation of a left acetabular fracture and to left SI joint arthrodesis.   Osseous structures demonstrate no definite evidence of recent fracture or other significant abnormality or detrimental change since 05/01/2024.  Disc narrowing and vertebral endplate spurring are incidentally seen at L3-4.                                       Medications   ketorolac injection 15 mg (15 mg Intramuscular Given 6/4/24 1159)   methocarbamoL tablet 500 mg (500 mg Oral Given 6/4/24 1159)   oxyCODONE immediate release tablet 5 mg (5 mg Oral Given 6/4/24 1751)   gadobutroL (GADAVIST) injection 10 mL (10 mLs Intravenous Given 6/4/24 1923)     Medical Decision Making  38-year-old female presents to the ED for evaluation after mechanical slip and fall yesterday.    Differential includes but not limited to femur fracture, hip fracture, cervical fracture, muscle strain sacral fracture     No midline bony cervical tenderness.  There is a paraspinal tenderness.  X-ray of the C-spine in his show any acute fractures.  No cecal fracture seen on x-ray.  X-ray of the left hip shows migration of a previously fractured screw.  Unable to without acute fracture or sequelae of infectious etiology.  Denies any infectious symptoms in have a low suspicion for this however will obtain lab work an MRI with and without to further characterize this.  At shift change he is pending labs and imaging.  Patient care and enough given [facility] team will follow up labs and imaging.    Amount and/or Complexity of Data Reviewed  Labs: ordered. Decision-making details documented in ED Course.  Radiology: ordered.    Risk  OTC drugs.  Prescription drug management.               ED Course as of 06/05/24 0707   Tue Jun 04, 2024   1544 WBC: 5.36  No leukocytosis   [HJ]   1544 CRP(!): 11.0  Slightly elevated from 1 month ago [HJ]      ED Course User Index  [HJ] Ric Mark PA-C                           Clinical Impression:  Final diagnoses:  [M54.2] Neck pain  [W19.XXXA] Fall  [M25.552] Left hip pain (Primary)          ED Disposition Condition     Discharge Stable          ED Prescriptions       Medication Sig Dispense Start Date End Date Auth. Provider    ibuprofen (ADVIL,MOTRIN) 600 MG tablet Take 1 tablet (600 mg total) by mouth every 6 (six) hours as needed for Pain. 20 tablet 6/4/2024 -- Renetta Hoang PA-C    oxyCODONE (ROXICODONE) 5 MG immediate release tablet Take 1 tablet (5 mg total) by mouth every 4 (four) hours as needed for Pain. 8 tablet 6/4/2024 -- Renetta Hoang PA-C          Follow-up Information       Follow up With Specialties Details Why Contact Info    [facility] IR, Dr. Tamara Pearce  Go on 6/6/2024 as scheduled     [facility] - Emergency Dept Emergency Medicine Go to  If symptoms worsen [id number]  [facility] [id number] [phone]             Ric Mark PA-C  06/04/24 1529       Ric Mark PA-C  06/05/24 0707

## 2024-06-05 NOTE — PROVIDER PROGRESS NOTES - EMERGENCY DEPT.
Encounter Date: 6/4/2024    ED Physician Progress Notes          Patient signed out to me by my colleague with instructions to follow-up pending work-up. Please see main ED note for previous ED stay documentation.      Patient signed out to me pending MRI. Briefly, this is a 38-year-old female with past medical history of chronic pain SP left total hip replacement, obesity, asthma presents to the ED for evaluation after a fall with left hip pain.  X-ray showed migration of a previously fractured screw. Unable to rule out acute fracture or sequelae of infectious etiology.     MRI Impression:     Enhancing synovium with fluid-filled femoroacetabular joint.  While this could represent inflammatory change and synovial hypertrophy from synovial osteochondromatosis, synovitis or septic joint are difficult to exclude by MR.  No bone marrow edema or bone enhancement to suggest osteomyelitis.    Lab work unremarkable.  No leukocytosis.  She denies any fever, body aches, or chills.  Suspect inflammatory change possibly related to trauma.  She does have decreased range of motion though this has been persistent even before trauma.  She has full passive range of motion.  Low suspicion for septic joint.  Discussed with orthopedics who recommends outpatient follow up.  She has arthrocentesis scheduled in 2 days.  Advised for close follow up with Orthopedics. Short course of pain medication prescribed given recent trauma increasing her pain.  Strict ED return precautions provided with all questions answered.  Patient verbalized understanding and agreed to plan.  Patient has wheelchair at baseline.

## 2024-06-13 ENCOUNTER — PATIENT MESSAGE (OUTPATIENT)
Dept: URGENT CARE | Facility: CLINIC | Age: 39
End: 2024-06-13
Payer: MEDICAID

## 2024-07-22 ENCOUNTER — TELEPHONE (OUTPATIENT)
Dept: ORTHOPEDICS | Facility: CLINIC | Age: 39
End: 2024-07-22
Payer: MEDICAID
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
## 2024-07-22 NOTE — TELEPHONE ENCOUNTER
Called the pt to inform her that our staff does not see pelvic fx and because of the hardware she has will not allow us to do a hip fx so the pt has to f/u at [facility]. Pt v/u

## 2024-07-24 DIAGNOSIS — M25.562 LEFT KNEE PAIN, UNSPECIFIED CHRONICITY: Primary | ICD-10-CM

## 2024-07-25 ENCOUNTER — TELEPHONE (OUTPATIENT)
Dept: ORTHOPEDICS | Facility: CLINIC | Age: 39
End: 2024-07-25
Payer: MEDICAID
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
## 2024-08-07 ENCOUNTER — TELEPHONE (OUTPATIENT)
Dept: OBSTETRICS AND GYNECOLOGY | Facility: CLINIC | Age: 39
End: 2024-08-07
Payer: MEDICAID

## 2025-01-29 ENCOUNTER — HOSPITAL ENCOUNTER (EMERGENCY)
Facility: HOSPITAL | Age: 40
Discharge: HOME | End: 2025-01-29
Attending: STUDENT IN AN ORGANIZED HEALTH CARE EDUCATION/TRAINING PROGRAM
Payer: MEDICAID

## 2025-01-29 VITALS
TEMPERATURE: 99 F | WEIGHT: 293 LBS | HEART RATE: 84 BPM | BODY MASS INDEX: 48.82 KG/M2 | RESPIRATION RATE: 18 BRPM | DIASTOLIC BLOOD PRESSURE: 76 MMHG | HEIGHT: 65 IN | OXYGEN SATURATION: 97 % | SYSTOLIC BLOOD PRESSURE: 120 MMHG

## 2025-01-29 DIAGNOSIS — K80.20 CALCULUS OF GALLBLADDER WITHOUT CHOLECYSTITIS WITHOUT OBSTRUCTION: ICD-10-CM

## 2025-01-29 DIAGNOSIS — R11.2 NAUSEA AND VOMITING, UNSPECIFIED VOMITING TYPE: Primary | ICD-10-CM

## 2025-01-29 DIAGNOSIS — R19.7 DIARRHEA, UNSPECIFIED TYPE: ICD-10-CM

## 2025-01-29 DIAGNOSIS — R16.0 HEPATOMEGALY: ICD-10-CM

## 2025-01-29 DIAGNOSIS — R10.9 ABDOMINAL PAIN, UNSPECIFIED ABDOMINAL LOCATION: ICD-10-CM

## 2025-01-29 LAB
ALBUMIN SERPL BCP-MCNC: 3.3 G/DL (ref 3.5–5.2)
ALP SERPL-CCNC: 78 U/L (ref 40–150)
ALT SERPL W/O P-5'-P-CCNC: 8 U/L (ref 10–44)
ANION GAP SERPL CALC-SCNC: 10 MMOL/L (ref 8–16)
AST SERPL-CCNC: 22 U/L (ref 10–40)
B-HCG UR QL: NEGATIVE
BACTERIA #/AREA URNS AUTO: NORMAL /HPF
BASOPHILS # BLD AUTO: 0.03 K/UL (ref 0–0.2)
BASOPHILS NFR BLD: 0.4 % (ref 0–1.9)
BILIRUB SERPL-MCNC: 0.4 MG/DL (ref 0.1–1)
BILIRUB UR QL STRIP: NEGATIVE
BUN SERPL-MCNC: 11 MG/DL (ref 6–20)
CALCIUM SERPL-MCNC: 8.4 MG/DL (ref 8.7–10.5)
CHLORIDE SERPL-SCNC: 103 MMOL/L (ref 95–110)
CLARITY UR REFRACT.AUTO: CLEAR
CO2 SERPL-SCNC: 23 MMOL/L (ref 23–29)
COLOR UR AUTO: YELLOW
CREAT SERPL-MCNC: 0.7 MG/DL (ref 0.5–1.4)
CTP QC/QA: YES
DIFFERENTIAL METHOD BLD: ABNORMAL
EOSINOPHIL # BLD AUTO: 0.8 K/UL (ref 0–0.5)
EOSINOPHIL NFR BLD: 9.7 % (ref 0–8)
ERYTHROCYTE [DISTWIDTH] IN BLOOD BY AUTOMATED COUNT: 18.8 % (ref 11.5–14.5)
EST. GFR  (NO RACE VARIABLE): >60 ML/MIN/1.73 M^2
GLUCOSE SERPL-MCNC: 74 MG/DL (ref 70–110)
GLUCOSE UR QL STRIP: NEGATIVE
HCT VFR BLD AUTO: 36.1 % (ref 37–48.5)
HCV AB SERPL QL IA: NORMAL
HGB BLD-MCNC: 11.1 G/DL (ref 12–16)
HGB UR QL STRIP: NEGATIVE
HIV 1+2 AB+HIV1 P24 AG SERPL QL IA: NORMAL
IMM GRANULOCYTES # BLD AUTO: 0.02 K/UL (ref 0–0.04)
IMM GRANULOCYTES NFR BLD AUTO: 0.3 % (ref 0–0.5)
KETONES UR QL STRIP: NEGATIVE
LEUKOCYTE ESTERASE UR QL STRIP: ABNORMAL
LIPASE SERPL-CCNC: 18 U/L (ref 4–60)
LYMPHOCYTES # BLD AUTO: 2.4 K/UL (ref 1–4.8)
LYMPHOCYTES NFR BLD: 31.3 % (ref 18–48)
MCH RBC QN AUTO: 22.3 PG (ref 27–31)
MCHC RBC AUTO-ENTMCNC: 30.7 G/DL (ref 32–36)
MCV RBC AUTO: 73 FL (ref 82–98)
MICROSCOPIC COMMENT: NORMAL
MONOCYTES # BLD AUTO: 0.4 K/UL (ref 0.3–1)
MONOCYTES NFR BLD: 4.6 % (ref 4–15)
NEUTROPHILS # BLD AUTO: 4.2 K/UL (ref 1.8–7.7)
NEUTROPHILS NFR BLD: 53.7 % (ref 38–73)
NITRITE UR QL STRIP: NEGATIVE
NRBC BLD-RTO: 0 /100 WBC
PH UR STRIP: 6 [PH] (ref 5–8)
PLATELET # BLD AUTO: 286 K/UL (ref 150–450)
PMV BLD AUTO: ABNORMAL FL (ref 9.2–12.9)
POTASSIUM SERPL-SCNC: 4.3 MMOL/L (ref 3.5–5.1)
PROT SERPL-MCNC: 8.6 G/DL (ref 6–8.4)
PROT UR QL STRIP: NEGATIVE
RBC # BLD AUTO: 4.98 M/UL (ref 4–5.4)
RBC #/AREA URNS AUTO: 1 /HPF (ref 0–4)
SODIUM SERPL-SCNC: 136 MMOL/L (ref 136–145)
SP GR UR STRIP: 1.02 (ref 1–1.03)
SQUAMOUS #/AREA URNS AUTO: 1 /HPF
URN SPEC COLLECT METH UR: ABNORMAL
WBC # BLD AUTO: 7.77 K/UL (ref 3.9–12.7)
WBC #/AREA URNS AUTO: 1 /HPF (ref 0–5)

## 2025-01-29 PROCEDURE — 81025 URINE PREGNANCY TEST: CPT | Performed by: EMERGENCY MEDICINE

## 2025-01-29 PROCEDURE — 25500020 PHARM REV CODE 255: Performed by: STUDENT IN AN ORGANIZED HEALTH CARE EDUCATION/TRAINING PROGRAM

## 2025-01-29 PROCEDURE — 96375 TX/PRO/DX INJ NEW DRUG ADDON: CPT

## 2025-01-29 PROCEDURE — 83690 ASSAY OF LIPASE: CPT | Performed by: EMERGENCY MEDICINE

## 2025-01-29 PROCEDURE — 81001 URINALYSIS AUTO W/SCOPE: CPT | Performed by: PHYSICIAN ASSISTANT

## 2025-01-29 PROCEDURE — 80053 COMPREHEN METABOLIC PANEL: CPT | Performed by: EMERGENCY MEDICINE

## 2025-01-29 PROCEDURE — 99285 EMERGENCY DEPT VISIT HI MDM: CPT | Mod: 25

## 2025-01-29 PROCEDURE — 63600175 PHARM REV CODE 636 W HCPCS: Performed by: PHYSICIAN ASSISTANT

## 2025-01-29 PROCEDURE — 86803 HEPATITIS C AB TEST: CPT | Performed by: PHYSICIAN ASSISTANT

## 2025-01-29 PROCEDURE — 96374 THER/PROPH/DIAG INJ IV PUSH: CPT | Mod: 59

## 2025-01-29 PROCEDURE — 85025 COMPLETE CBC W/AUTO DIFF WBC: CPT | Performed by: EMERGENCY MEDICINE

## 2025-01-29 PROCEDURE — 87389 HIV-1 AG W/HIV-1&-2 AB AG IA: CPT | Performed by: PHYSICIAN ASSISTANT

## 2025-01-29 PROCEDURE — 25000003 PHARM REV CODE 250: Performed by: EMERGENCY MEDICINE

## 2025-01-29 RX ORDER — DIPHENHYDRAMINE HYDROCHLORIDE 50 MG/ML
25 INJECTION INTRAMUSCULAR; INTRAVENOUS
Status: COMPLETED | OUTPATIENT
Start: 2025-01-29 | End: 2025-01-29

## 2025-01-29 RX ORDER — KETOROLAC TROMETHAMINE 30 MG/ML
15 INJECTION, SOLUTION INTRAMUSCULAR; INTRAVENOUS
Status: COMPLETED | OUTPATIENT
Start: 2025-01-29 | End: 2025-01-29

## 2025-01-29 RX ORDER — ONDANSETRON 4 MG/1
4 TABLET, ORALLY DISINTEGRATING ORAL
Status: COMPLETED | OUTPATIENT
Start: 2025-01-29 | End: 2025-01-29

## 2025-01-29 RX ORDER — DICYCLOMINE HYDROCHLORIDE 20 MG/1
20 TABLET ORAL 2 TIMES DAILY
Qty: 20 TABLET | Refills: 0 | Status: SHIPPED | OUTPATIENT
Start: 2025-01-29 | End: 2025-02-08

## 2025-01-29 RX ORDER — ONDANSETRON 4 MG/1
4 TABLET, ORALLY DISINTEGRATING ORAL EVERY 6 HOURS PRN
Qty: 15 TABLET | Refills: 0 | Status: SHIPPED | OUTPATIENT
Start: 2025-01-29

## 2025-01-29 RX ADMIN — DIPHENHYDRAMINE HYDROCHLORIDE 25 MG: 50 INJECTION, SOLUTION INTRAMUSCULAR; INTRAVENOUS at 06:01

## 2025-01-29 RX ADMIN — KETOROLAC TROMETHAMINE 15 MG: 30 INJECTION, SOLUTION INTRAMUSCULAR; INTRAVENOUS at 04:01

## 2025-01-29 RX ADMIN — IOHEXOL 100 ML: 350 INJECTION, SOLUTION INTRAVENOUS at 06:01

## 2025-01-29 RX ADMIN — ONDANSETRON 4 MG: 4 TABLET, ORALLY DISINTEGRATING ORAL at 04:01

## 2025-01-29 NOTE — ED NOTES
Patient identifiers verified and correct for  Ms Lombardo  C/C:  ABd pain , hip pain SEE NN  APPEARANCE: awake and alert in NAD. PAIN  10/10  SKIN: warm, dry and intact. No breakdown or bruising.  MUSCULOSKELETAL: Patient moving all extremities spontaneously, no obvious swelling or deformities noted. Ambulates independently.  RESPIRATORY: Denies shortness of breath.Respirations unlabored.   CARDIAC: Denies CP, 2+ distal pulses; no peripheral edema  ABDOMEN: Abdomen large, round, reports diarrhea, all over pain, states nausea, acid taste   : voids spontaneously, denies difficulty  Neurologic: AAO x 4; follows commands equal strength in all extremities; denies numbness/tingling. Denies dizziness Denies new weakness
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
## 2025-01-30 NOTE — DISCHARGE INSTRUCTIONS
Hydration: It is crucial to stay hydrated. Sip clear fluids such as water, electrolyte solutions (e.g., oral rehydration solutions, sports drinks), and ginger ale. Avoid caffeinated and sugary drinks.    Diet: Once you are tolerating clear liquids okay, start with bland, easy-to-digest foods such as plain toast, crackers, rice, bananas, and applesauce. Gradually introduce more solid foods as tolerated.    Medications: Take anti-nausea medications as prescribed. Take the prescribed Bentyl as needed for abdominal cramping.     Rest: Get plenty of rest to aid in recovery. Avoid strenuous activities until symptoms subside.    Follow-up: Follow-up with your primary care provider within the next week.     Return to the emergency room for severe abdominal pain, vomiting unrelieved with your anti-nausea medications, fever, or other concerning symptoms.

## 2025-06-28 ENCOUNTER — HOSPITAL ENCOUNTER (EMERGENCY)
Facility: HOSPITAL | Age: 40
Discharge: HOME | End: 2025-06-28
Attending: EMERGENCY MEDICINE
Payer: MEDICAID

## 2025-06-28 VITALS
DIASTOLIC BLOOD PRESSURE: 77 MMHG | WEIGHT: 293 LBS | RESPIRATION RATE: 20 BRPM | TEMPERATURE: 99 F | HEIGHT: 65 IN | HEART RATE: 86 BPM | OXYGEN SATURATION: 100 % | SYSTOLIC BLOOD PRESSURE: 119 MMHG | BODY MASS INDEX: 48.82 KG/M2

## 2025-06-28 DIAGNOSIS — R10.9 ABDOMINAL PAIN, UNSPECIFIED ABDOMINAL LOCATION: Primary | ICD-10-CM

## 2025-06-28 DIAGNOSIS — R11.2 NAUSEA AND VOMITING, UNSPECIFIED VOMITING TYPE: ICD-10-CM

## 2025-06-28 DIAGNOSIS — R31.9 HEMATURIA, UNSPECIFIED TYPE: ICD-10-CM

## 2025-06-28 LAB
ABSOLUTE EOSINOPHIL (OHS): 0.35 K/UL
ABSOLUTE MONOCYTE (OHS): 0.31 K/UL (ref 0.3–1)
ABSOLUTE NEUTROPHIL COUNT (OHS): 3.16 K/UL (ref 1.8–7.7)
ALBUMIN SERPL BCP-MCNC: 3.2 G/DL (ref 3.5–5.2)
ALP SERPL-CCNC: 84 UNIT/L (ref 40–150)
ALT SERPL W/O P-5'-P-CCNC: 23 UNIT/L (ref 10–44)
ANION GAP (OHS): 12 MMOL/L (ref 8–16)
AST SERPL-CCNC: 20 UNIT/L (ref 11–45)
B-HCG UR QL: NEGATIVE
BACTERIA #/AREA URNS AUTO: NORMAL /HPF
BASOPHILS # BLD AUTO: 0.04 K/UL
BASOPHILS NFR BLD AUTO: 0.7 %
BILIRUB SERPL-MCNC: 0.3 MG/DL (ref 0.1–1)
BILIRUB UR QL STRIP.AUTO: NEGATIVE
BUN SERPL-MCNC: 14 MG/DL (ref 6–20)
BUN SERPL-MCNC: 14 MG/DL (ref 6–30)
CALCIUM SERPL-MCNC: 8.4 MG/DL (ref 8.7–10.5)
CHLORIDE SERPL-SCNC: 105 MMOL/L (ref 95–110)
CHLORIDE SERPL-SCNC: 108 MMOL/L (ref 95–110)
CLARITY UR: CLEAR
CO2 SERPL-SCNC: 20 MMOL/L (ref 23–29)
COLOR UR AUTO: YELLOW
CREAT SERPL-MCNC: 0.8 MG/DL (ref 0.5–1.4)
CREAT SERPL-MCNC: 0.9 MG/DL (ref 0.5–1.4)
CTP QC/QA: YES
ERYTHROCYTE [DISTWIDTH] IN BLOOD BY AUTOMATED COUNT: 19.4 % (ref 11.5–14.5)
GFR SERPLBLD CREATININE-BSD FMLA CKD-EPI: >60 ML/MIN/1.73/M2
GLUCOSE SERPL-MCNC: 86 MG/DL (ref 70–110)
GLUCOSE SERPL-MCNC: 87 MG/DL (ref 70–110)
GLUCOSE UR QL STRIP: NEGATIVE
HCT VFR BLD AUTO: 29.2 % (ref 37–48.5)
HCT VFR BLD CALC: 28 %PCV (ref 36–54)
HGB BLD-MCNC: 8.5 GM/DL (ref 12–16)
HGB UR QL STRIP: ABNORMAL
IMM GRANULOCYTES # BLD AUTO: 0.02 K/UL (ref 0–0.04)
IMM GRANULOCYTES NFR BLD AUTO: 0.3 % (ref 0–0.5)
KETONES UR QL STRIP: NEGATIVE
LEUKOCYTE ESTERASE UR QL STRIP: ABNORMAL
LIPASE SERPL-CCNC: 23 U/L (ref 4–60)
LYMPHOCYTES # BLD AUTO: 2.11 K/UL (ref 1–4.8)
MCH RBC QN AUTO: 22.1 PG (ref 27–31)
MCHC RBC AUTO-ENTMCNC: 29.1 G/DL (ref 32–36)
MCV RBC AUTO: 76 FL (ref 82–98)
MICROSCOPIC COMMENT: NORMAL
NITRITE UR QL STRIP: NEGATIVE
NUCLEATED RBC (/100WBC) (OHS): 0 /100 WBC
PH UR STRIP: 5 [PH]
PLATELET # BLD AUTO: 262 K/UL (ref 150–450)
PMV BLD AUTO: 12.7 FL (ref 9.2–12.9)
POC IONIZED CALCIUM: 1.05 MMOL/L (ref 1.06–1.42)
POC TCO2 (MEASURED): 22 MMOL/L (ref 23–29)
POTASSIUM BLD-SCNC: 3.7 MMOL/L (ref 3.5–5.1)
POTASSIUM SERPL-SCNC: 3.8 MMOL/L (ref 3.5–5.1)
PROT SERPL-MCNC: 7.5 GM/DL (ref 6–8.4)
PROT UR QL STRIP: NEGATIVE
RBC # BLD AUTO: 3.85 M/UL (ref 4–5.4)
RBC #/AREA URNS AUTO: 2 /HPF (ref 0–4)
RELATIVE EOSINOPHIL (OHS): 5.8 %
RELATIVE LYMPHOCYTE (OHS): 35.2 % (ref 18–48)
RELATIVE MONOCYTE (OHS): 5.2 % (ref 4–15)
RELATIVE NEUTROPHIL (OHS): 52.8 % (ref 38–73)
SAMPLE: ABNORMAL
SODIUM BLD-SCNC: 141 MMOL/L (ref 136–145)
SODIUM SERPL-SCNC: 140 MMOL/L (ref 136–145)
SP GR UR STRIP: 1.01
SQUAMOUS #/AREA URNS AUTO: 6 /HPF
UROBILINOGEN UR STRIP-ACNC: NEGATIVE EU/DL
WBC # BLD AUTO: 5.99 K/UL (ref 3.9–12.7)
WBC #/AREA URNS AUTO: 3 /HPF (ref 0–5)

## 2025-06-28 PROCEDURE — 81001 URINALYSIS AUTO W/SCOPE: CPT

## 2025-06-28 PROCEDURE — 96374 THER/PROPH/DIAG INJ IV PUSH: CPT

## 2025-06-28 PROCEDURE — 85025 COMPLETE CBC W/AUTO DIFF WBC: CPT

## 2025-06-28 PROCEDURE — 80053 COMPREHEN METABOLIC PANEL: CPT

## 2025-06-28 PROCEDURE — 96375 TX/PRO/DX INJ NEW DRUG ADDON: CPT

## 2025-06-28 PROCEDURE — 81025 URINE PREGNANCY TEST: CPT

## 2025-06-28 PROCEDURE — 82330 ASSAY OF CALCIUM: CPT | Mod: 59

## 2025-06-28 PROCEDURE — 99284 EMERGENCY DEPT VISIT MOD MDM: CPT | Mod: 25

## 2025-06-28 PROCEDURE — 63600175 PHARM REV CODE 636 W HCPCS

## 2025-06-28 PROCEDURE — 80047 BASIC METABLC PNL IONIZED CA: CPT

## 2025-06-28 PROCEDURE — 83690 ASSAY OF LIPASE: CPT

## 2025-06-28 RX ORDER — ONDANSETRON 4 MG/1
4 TABLET, ORALLY DISINTEGRATING ORAL EVERY 6 HOURS PRN
Qty: 30 TABLET | Refills: 0 | Status: SHIPPED | OUTPATIENT
Start: 2025-06-28

## 2025-06-28 RX ORDER — KETOROLAC TROMETHAMINE 30 MG/ML
10 INJECTION, SOLUTION INTRAMUSCULAR; INTRAVENOUS
Status: COMPLETED | OUTPATIENT
Start: 2025-06-28 | End: 2025-06-28

## 2025-06-28 RX ORDER — ONDANSETRON HYDROCHLORIDE 2 MG/ML
4 INJECTION, SOLUTION INTRAVENOUS
Status: COMPLETED | OUTPATIENT
Start: 2025-06-28 | End: 2025-06-28

## 2025-06-28 RX ORDER — ONDANSETRON 4 MG/1
4 TABLET, ORALLY DISINTEGRATING ORAL EVERY 6 HOURS PRN
Qty: 30 TABLET | Refills: 0 | Status: SHIPPED | OUTPATIENT
Start: 2025-06-28 | End: 2025-06-28

## 2025-06-28 RX ADMIN — KETOROLAC TROMETHAMINE 10 MG: 30 INJECTION, SOLUTION INTRAMUSCULAR; INTRAVENOUS at 08:06

## 2025-06-28 RX ADMIN — ONDANSETRON 4 MG: 2 INJECTION INTRAMUSCULAR; INTRAVENOUS at 08:06

## 2025-06-28 NOTE — DISCHARGE INSTRUCTIONS
You were seen in the emergency room for vomiting abdominal pain.  We suspect that your symptoms are likely due to food that UA yesterday.  Your labs that we obtained were very reassuring.  Your urine did show that you have blood in her urine.  We recommend that he follow up with your primary care provider about that for further workup and evaluation.  Please return to the emergency room if you develop significant worsening of your pain, or fevers.  You can take Zofran every 6 hours.  This is for nausea.  Recommend ibuprofen for pain.

## 2025-06-28 NOTE — ED TRIAGE NOTES
Siddhartha Lombardo, a 39 y.o. female presents to the ED w/ complaint of diarrhea since eating at [employer]. Per patient, her son also had vomitting and diarrhea. Pt also complains of vaginal itching, burning, and bleeding. Pt states she saw streaks of blood when wiping. Pt also complains of bumps and pain on her breasts and buttocks. Pt endorses LUQ abdominal pain. Pt is Aaox4, GCS 15.    Triage note:  Chief Complaint   Patient presents with    Vomiting     Pt reports N/V since eating at [employer] last night.      Review of patient's allergies indicates:   Allergen Reactions    Hydrocodone-acetaminophen Swelling     Pt can tolerate Percocet, but cannot take Norco  NO ISSUES WITH TAKING PERCOCET    Morphine Shortness Of Breath    Peanut     Shellfish containing products     Sulfamethoxazole-trimethoprim Hives and Itching    Iodine and iodide containing products Itching     Past Medical History:   Diagnosis Date    Asthma     Chronic hip pain after total replacement of left hip joint     Hypertension     Obesity

## 2025-06-28 NOTE — ED NOTES
I-STAT Chem-8+ Results:   Value Reference Range   Sodium 141 136-145 mmol/L   Potassium  3.7 3.5-5.1 mmol/L   Chloride 105  mmol/L   Ionized Calcium 1.05 1.06-1.42 mmol/L   CO2 (measured) 22 23-29 mmol/L   Glucose 87  mg/dL   BUN 14 6-30 mg/dL   Creatinine 0.9 0.5-1.4 mg/dL   Hematocrit 28 36-54%

## 2025-06-28 NOTE — ED PROVIDER NOTES
Encounter Date: 6/28/2025       History     Chief Complaint   Patient presents with    Vomiting     Pt reports N/V since eating at [employer] last night.      Patient is a 39-year-old with history of hip fracture s/p recent surgery presenting due to nausea and vomiting.  Patient states that she ate at [employer] yesterday.  Shortly after eating at [employer] her and her son began vomiting.  She had 1 episode of diarrhea.  Has generalized abdominal pain.  Denies any associated fevers or chills.  Denies any back pain, neck pain, lightheadedness or dizziness.  Onset was sudden.  No associated hematochezia, melena hemoptysis or hematemesis        Review of patient's allergies indicates:   Allergen Reactions    Hydrocodone-acetaminophen Swelling     Pt can tolerate Percocet, but cannot take Norco  NO ISSUES WITH TAKING PERCOCET    Morphine Shortness Of Breath    Peanut     Shellfish containing products     Sulfamethoxazole-trimethoprim Hives and Itching    Iodine and iodide containing products Itching     Past Medical History:   Diagnosis Date    Asthma     Chronic hip pain after total replacement of left hip joint     Hypertension     Obesity      Past Surgical History:   Procedure Laterality Date    APPENDECTOMY  07/01/2013    BILATERAL TUBAL LIGATION      BOWEL RESECTION  07/01/2013    BREAST SURGERY      right breast I&D x 3    HIP FRACTURE SURGERY Left     Left hip surgery x 2    KNEE SURGERY Left     TONSILLECTOMY       Family History   Problem Relation Name Age of Onset    Breast cancer Other P Great Aunt     Ovarian cancer Neg Hx       Social History[1]  Review of Systems  Per HPI  Physical Exam     Initial Vitals [06/28/25 0556]   BP Pulse Resp Temp SpO2   (!) 148/88 94 18 98.5 °F (36.9 °C) 99 %      MAP       --         Physical Exam    Nursing note and vitals reviewed.  Constitutional: She appears well-developed and well-nourished. She is not diaphoretic. No distress.   HENT:   Head: Normocephalic.   Eyes:  Conjunctivae are normal. Right eye exhibits no discharge. Left eye exhibits no discharge. No scleral icterus.   Cardiovascular:  Normal rate, regular rhythm and normal heart sounds.           No murmur heard.  Pulmonary/Chest: Breath sounds normal. No stridor. No respiratory distress. She has no wheezes. She has no rhonchi. She has no rales.   Abdominal: Abdomen is soft. She exhibits no distension. There is no abdominal tenderness. There is no rebound and no guarding.   Musculoskeletal:         General: No tenderness or edema.     Neurological: She is alert. GCS score is 15. GCS eye subscore is 4. GCS verbal subscore is 5. GCS motor subscore is 6.   Skin: Skin is warm and dry.   Psychiatric: She has a normal mood and affect.         ED Course   Procedures  Labs Reviewed   COMPREHENSIVE METABOLIC PANEL - Abnormal       Result Value    Sodium 140      Potassium 3.8      Chloride 108      CO2 20 (*)     Glucose 86      BUN 14      Creatinine 0.8      Calcium 8.4 (*)     Protein Total 7.5      Albumin 3.2 (*)     Bilirubin Total 0.3      ALP 84      AST 20      ALT 23      Anion Gap 12      eGFR >60     URINALYSIS, REFLEX TO URINE CULTURE - Abnormal    Color, UA Yellow      Appearance, UA Clear      pH, UA 5.0      Spec Grav UA 1.015      Protein, UA Negative      Glucose, UA Negative      Ketones, UA Negative      Bilirubin, UA Negative      Blood, UA 1+ (*)     Nitrites, UA Negative      Urobilinogen, UA Negative      Leukocyte Esterase, UA 2+ (*)    CBC WITH DIFFERENTIAL - Abnormal    WBC 5.99      RBC 3.85 (*)     HGB 8.5 (*)     HCT 29.2 (*)     MCV 76 (*)     MCH 22.1 (*)     MCHC 29.1 (*)     RDW 19.4 (*)     Platelet Count 262      MPV 12.7      Nucleated RBC 0      Neut % 52.8      Lymph % 35.2      Mono % 5.2      Eos % 5.8      Basophil % 0.7      Imm Grans % 0.3      Neut # 3.16      Lymph # 2.11      Mono # 0.31      Eos # 0.35      Baso # 0.04      Imm Grans # 0.02      Narrative:     This is an appended  report.  These results have been appended to a previously verified report.   ISTAT PROCEDURE - Abnormal    POC Glucose 87      POC BUN 14      POC Creatinine 0.9      POC Sodium 141      POC Potassium 3.7      POC Chloride 105      POC TCO2 (MEASURED) 22 (*)     POC Ionized Calcium 1.05 (*)     POC Hematocrit 28 (*)     Sample ANGELA     LIPASE - Normal    Lipase Level 23     CBC W/ AUTO DIFFERENTIAL    Narrative:     The following orders were created for panel order CBC W/ AUTO DIFFERENTIAL.  Procedure                               Abnormality         Status                     ---------                               -----------         ------                     CBC with Differential[[id number]]       Abnormal            Final result                 Please view results for these tests on the individual orders.   URINALYSIS MICROSCOPIC    RBC, UA 2      WBC, UA 3      Bacteria, UA Rare      Squamous Epithelial Cells, UA 6      Microscopic Comment       GREY TOP URINE HOLD   POCT URINE PREGNANCY    POC Preg Test, Ur Negative       Acceptable Yes            Imaging Results    None          Medications   ketorolac injection 9.999 mg (9.999 mg Intravenous Given 6/28/25 0840)   ondansetron injection 4 mg (4 mg Intravenous Given 6/28/25 0839)     Medical Decision Making  Patient is a 39-year-old afebrile, HDS, NAD, well-appearing female presenting due to abdominal pain and vomiting.    DDX:  Gastroenteritis, pancreatitis, cholecystitis, appendicitis, UTI, pyelonephritis    Afebrile without leukocytosis.  Abdominal exam largely benign. LFTs and bilirubin WNL.  Lipase WNL.  UA with blood and leukocyte esterase.  Rare bacteria noted on microscopy.  Sample taken via appear purewick.  No urinary symptoms on history.  Low concern for UTI, pyelo, appendicitis, pancreatitis, cholecystitis.  Pain nausea significantly improved with Zofran and Toradol.  Suspect likely gastroenteritis VS food poisoning.  Discussed plan  to discharge home with PCP follow up with the patient who agreed.  Questions answered.  Return precautions given.    Amount and/or Complexity of Data Reviewed  Labs: ordered. Decision-making details documented in ED Course.    Risk  Prescription drug management.              Attending Attestation:   Physician Attestation Statement for Resident:  As the supervising MD   Physician Attestation Statement: I have personally seen and examined this patient.   I agree with the above history.  -:   As the supervising MD I agree with the above PE.     As the supervising MD I agree with the above treatment, course, plan, and disposition.                      I have reviewed and concur with the resident's history, physical, assessment, and plan.  I have personally interviewed and examined the patient at bedside.   I did supervise any and all procedures and was present for any critical portion, and was always immediately available for help and as a resource.     The above history physical, review of symptoms, HPI and physical exam reflect my independent interpretation and evaluation.    Complexity: Moderate Risk    Final diagnoses:  [R10.9] Abdominal pain, unspecified abdominal location (Primary)  [R11.2] Nausea and vomiting, unspecified vomiting type  [R31.9] Hematuria, unspecified type     Andrew Velasquez DO, EMY, FACEP  Senior Emergency Staff Physician   Dept of Emergency Medicine   [facility]        Disclaimer: This note has been generated using voice-recognition software. There may be typographical errors that have been missed during proof-reading.            ED Course as of 06/29/25 0757   Sat Jun 28, 2025   0836 Leukocyte Esterase, UA(!): 2+ [MB]   1621 Hemoglobin(!): 8.5  Below patient's baseline.  Microcytic [MB]   1621 Blood, UA(!): 1+ [MB]      ED Course User Index  [MB] Brooks García MD                           Clinical Impression:  Final diagnoses:  [R10.9] Abdominal pain, unspecified abdominal  location (Primary)  [R11.2] Nausea and vomiting, unspecified vomiting type  [R31.9] Hematuria, unspecified type          ED Disposition Condition    Discharge Stable          ED Prescriptions       Medication Sig Dispense Start Date End Date Auth. Provider    ondansetron (ZOFRAN-ODT) 4 MG TbDL  (Status: Discontinued) Take 1 tablet (4 mg total) by mouth every 6 (six) hours as needed. 30 tablet 6/28/2025 6/28/2025 Brooks García MD    ondansetron (ZOFRAN-ODT) 4 MG TbDL Take 1 tablet (4 mg total) by mouth every 6 (six) hours as needed. 30 tablet 6/28/2025 -- Andrew Velasquez DO          Follow-up Information       Follow up With Specialties Details Why Contact Info    Karen Antonio MD Internal Medicine   [address] [place] [id number]  [phone]                   Brooks García MD  Resident  06/28/25 8776         [1]   Social History  Tobacco Use    Smoking status: Every Day     Current packs/day: 0.50     Average packs/day: 0.5 packs/day for 5.0 years (2.5 ttl pk-yrs)     Types: Cigarettes    Smokeless tobacco: Current   Substance Use Topics    Alcohol use: No     Alcohol/week: 0.0 standard drinks of alcohol    Drug use: Yes     Types: Marijuana        Andrew Velasquez DO  06/29/25 8147

## 2025-06-29 LAB — HOLD SPECIMEN: NORMAL
